# Patient Record
Sex: FEMALE | Race: OTHER | Employment: UNEMPLOYED | URBAN - METROPOLITAN AREA
[De-identification: names, ages, dates, MRNs, and addresses within clinical notes are randomized per-mention and may not be internally consistent; named-entity substitution may affect disease eponyms.]

---

## 2023-08-15 ENCOUNTER — OFFICE VISIT (OUTPATIENT)
Dept: PRIMARY CARE | Facility: CLINIC | Age: 58
End: 2023-08-15
Payer: COMMERCIAL

## 2023-08-15 DIAGNOSIS — E78.00 PURE HYPERCHOLESTEROLEMIA: ICD-10-CM

## 2023-08-15 DIAGNOSIS — Z53.20 COLON CANCER SCREENING DECLINED: ICD-10-CM

## 2023-08-15 DIAGNOSIS — E11.9 TYPE 2 DIABETES MELLITUS WITHOUT COMPLICATION, WITHOUT LONG-TERM CURRENT USE OF INSULIN (MULTI): Primary | ICD-10-CM

## 2023-08-15 DIAGNOSIS — I10 BENIGN ESSENTIAL HYPERTENSION: ICD-10-CM

## 2023-08-15 DIAGNOSIS — E66.09 CLASS 2 OBESITY DUE TO EXCESS CALORIES WITHOUT SERIOUS COMORBIDITY WITH BODY MASS INDEX (BMI) OF 36.0 TO 36.9 IN ADULT: ICD-10-CM

## 2023-08-15 DIAGNOSIS — I48.0 PAROXYSMAL ATRIAL FIBRILLATION (MULTI): ICD-10-CM

## 2023-08-15 DIAGNOSIS — Z00.00 ROUTINE GENERAL MEDICAL EXAMINATION AT A HEALTH CARE FACILITY: ICD-10-CM

## 2023-08-15 DIAGNOSIS — I48.11 LONGSTANDING PERSISTENT ATRIAL FIBRILLATION (MULTI): ICD-10-CM

## 2023-08-15 DIAGNOSIS — Z53.20 MAMMOGRAM DECLINED: ICD-10-CM

## 2023-08-15 LAB
ALANINE AMINOTRANSFERASE (SGPT) (U/L) IN SER/PLAS: 16 U/L (ref 7–45)
ALBUMIN (G/DL) IN SER/PLAS: 4.2 G/DL (ref 3.4–5)
ALKALINE PHOSPHATASE (U/L) IN SER/PLAS: 75 U/L (ref 33–110)
ANION GAP IN SER/PLAS: 15 MMOL/L (ref 10–20)
ASPARTATE AMINOTRANSFERASE (SGOT) (U/L) IN SER/PLAS: 16 U/L (ref 9–39)
BILIRUBIN TOTAL (MG/DL) IN SER/PLAS: 0.6 MG/DL (ref 0–1.2)
CALCIUM (MG/DL) IN SER/PLAS: 9.5 MG/DL (ref 8.6–10.6)
CARBON DIOXIDE, TOTAL (MMOL/L) IN SER/PLAS: 24 MMOL/L (ref 21–32)
CHLORIDE (MMOL/L) IN SER/PLAS: 106 MMOL/L (ref 98–107)
CHOLESTEROL (MG/DL) IN SER/PLAS: 151 MG/DL (ref 0–199)
CHOLESTEROL IN HDL (MG/DL) IN SER/PLAS: 56.5 MG/DL
CHOLESTEROL/HDL RATIO: 2.7
COBALAMIN (VITAMIN B12) (PG/ML) IN SER/PLAS: 808 PG/ML (ref 211–911)
CREATININE (MG/DL) IN SER/PLAS: 0.67 MG/DL (ref 0.5–1.05)
GFR FEMALE: >90 ML/MIN/1.73M2
GLUCOSE (MG/DL) IN SER/PLAS: 104 MG/DL (ref 74–99)
LDL: 70 MG/DL (ref 0–99)
POC HEMOGLOBIN A1C: 5.7 % (ref 4.2–6.5)
POTASSIUM (MMOL/L) IN SER/PLAS: 4 MMOL/L (ref 3.5–5.3)
PROTEIN TOTAL: 7.4 G/DL (ref 6.4–8.2)
SODIUM (MMOL/L) IN SER/PLAS: 141 MMOL/L (ref 136–145)
THYROTROPIN (MIU/L) IN SER/PLAS BY DETECTION LIMIT <= 0.05 MIU/L: 2.57 MIU/L (ref 0.44–3.98)
THYROXINE (T4) FREE (NG/DL) IN SER/PLAS: 1.14 NG/DL (ref 0.78–1.48)
TRIGLYCERIDE (MG/DL) IN SER/PLAS: 123 MG/DL (ref 0–149)
UREA NITROGEN (MG/DL) IN SER/PLAS: 18 MG/DL (ref 6–23)
VLDL: 25 MG/DL (ref 0–40)

## 2023-08-15 PROCEDURE — 84439 ASSAY OF FREE THYROXINE: CPT

## 2023-08-15 PROCEDURE — 82607 VITAMIN B-12: CPT

## 2023-08-15 PROCEDURE — 84443 ASSAY THYROID STIM HORMONE: CPT

## 2023-08-15 PROCEDURE — 85027 COMPLETE CBC AUTOMATED: CPT

## 2023-08-15 PROCEDURE — 80053 COMPREHEN METABOLIC PANEL: CPT

## 2023-08-15 PROCEDURE — 82570 ASSAY OF URINE CREATININE: CPT

## 2023-08-15 PROCEDURE — 3077F SYST BP >= 140 MM HG: CPT | Performed by: FAMILY MEDICINE

## 2023-08-15 PROCEDURE — 1036F TOBACCO NON-USER: CPT | Performed by: FAMILY MEDICINE

## 2023-08-15 PROCEDURE — 3080F DIAST BP >= 90 MM HG: CPT | Performed by: FAMILY MEDICINE

## 2023-08-15 PROCEDURE — 80061 LIPID PANEL: CPT

## 2023-08-15 PROCEDURE — 3008F BODY MASS INDEX DOCD: CPT | Performed by: FAMILY MEDICINE

## 2023-08-15 PROCEDURE — 82043 UR ALBUMIN QUANTITATIVE: CPT

## 2023-08-15 PROCEDURE — 4010F ACE/ARB THERAPY RXD/TAKEN: CPT | Performed by: FAMILY MEDICINE

## 2023-08-15 PROCEDURE — 83036 HEMOGLOBIN GLYCOSYLATED A1C: CPT | Performed by: FAMILY MEDICINE

## 2023-08-15 PROCEDURE — 99386 PREV VISIT NEW AGE 40-64: CPT | Performed by: FAMILY MEDICINE

## 2023-08-15 RX ORDER — RIVAROXABAN 20 MG/1
20 TABLET, FILM COATED ORAL NIGHTLY
COMMUNITY
Start: 2023-07-10 | End: 2024-02-06 | Stop reason: SDUPTHER

## 2023-08-15 RX ORDER — METOPROLOL SUCCINATE 50 MG/1
50 TABLET, EXTENDED RELEASE ORAL DAILY
COMMUNITY
Start: 2023-07-30 | End: 2024-01-30 | Stop reason: SDUPTHER

## 2023-08-15 RX ORDER — LOSARTAN POTASSIUM 50 MG/1
50 TABLET ORAL DAILY
COMMUNITY
Start: 2023-07-30 | End: 2023-08-18 | Stop reason: ALTCHOICE

## 2023-08-15 RX ORDER — ATORVASTATIN CALCIUM 40 MG/1
40 TABLET, FILM COATED ORAL DAILY
COMMUNITY
Start: 2023-07-24 | End: 2024-02-06 | Stop reason: SDUPTHER

## 2023-08-15 RX ORDER — METFORMIN HYDROCHLORIDE 500 MG/1
TABLET ORAL
COMMUNITY
Start: 2023-07-29 | End: 2023-10-20 | Stop reason: SDUPTHER

## 2023-08-15 ASSESSMENT — PATIENT HEALTH QUESTIONNAIRE - PHQ9
SUM OF ALL RESPONSES TO PHQ9 QUESTIONS 1 AND 2: 0
1. LITTLE INTEREST OR PLEASURE IN DOING THINGS: NOT AT ALL
2. FEELING DOWN, DEPRESSED OR HOPELESS: NOT AT ALL

## 2023-08-16 LAB
ALBUMIN (MG/L) IN URINE: 229 MG/L
ALBUMIN/CREATININE (UG/MG) IN URINE: 163.6 UG/MG CRT (ref 0–30)
CREATININE (MG/DL) IN URINE: 140 MG/DL (ref 20–320)
ERYTHROCYTE DISTRIBUTION WIDTH (RATIO) BY AUTOMATED COUNT: 13 % (ref 11.5–14.5)
ERYTHROCYTE MEAN CORPUSCULAR HEMOGLOBIN CONCENTRATION (G/DL) BY AUTOMATED: 30.6 G/DL (ref 32–36)
ERYTHROCYTE MEAN CORPUSCULAR VOLUME (FL) BY AUTOMATED COUNT: 94 FL (ref 80–100)
ERYTHROCYTES (10*6/UL) IN BLOOD BY AUTOMATED COUNT: 4.92 X10E12/L (ref 4–5.2)
HEMATOCRIT (%) IN BLOOD BY AUTOMATED COUNT: 46.1 % (ref 36–46)
HEMOGLOBIN (G/DL) IN BLOOD: 14.1 G/DL (ref 12–16)
LEUKOCYTES (10*3/UL) IN BLOOD BY AUTOMATED COUNT: 10 X10E9/L (ref 4.4–11.3)
NRBC (PER 100 WBCS) BY AUTOMATED COUNT: 0 /100 WBC (ref 0–0)
PLATELETS (10*3/UL) IN BLOOD AUTOMATED COUNT: 356 X10E9/L (ref 150–450)

## 2023-08-18 DIAGNOSIS — I10 BENIGN ESSENTIAL HYPERTENSION: Primary | ICD-10-CM

## 2023-08-18 RX ORDER — LOSARTAN POTASSIUM 100 MG/1
100 TABLET ORAL DAILY
Qty: 90 TABLET | Refills: 1 | Status: SHIPPED | OUTPATIENT
Start: 2023-08-18 | End: 2024-05-17 | Stop reason: ALTCHOICE

## 2023-08-20 VITALS
WEIGHT: 215 LBS | SYSTOLIC BLOOD PRESSURE: 150 MMHG | BODY MASS INDEX: 35.82 KG/M2 | HEIGHT: 65 IN | HEART RATE: 80 BPM | DIASTOLIC BLOOD PRESSURE: 90 MMHG

## 2023-08-20 PROBLEM — Z00.00 ROUTINE GENERAL MEDICAL EXAMINATION AT A HEALTH CARE FACILITY: Status: ACTIVE | Noted: 2023-08-20

## 2023-08-20 PROBLEM — I10 BENIGN ESSENTIAL HYPERTENSION: Status: ACTIVE | Noted: 2023-08-20

## 2023-08-20 PROBLEM — E66.09 CLASS 2 OBESITY DUE TO EXCESS CALORIES WITHOUT SERIOUS COMORBIDITY WITH BODY MASS INDEX (BMI) OF 36.0 TO 36.9 IN ADULT: Status: ACTIVE | Noted: 2023-08-20

## 2023-08-20 PROBLEM — E66.812 CLASS 2 OBESITY DUE TO EXCESS CALORIES WITHOUT SERIOUS COMORBIDITY WITH BODY MASS INDEX (BMI) OF 36.0 TO 36.9 IN ADULT: Status: ACTIVE | Noted: 2023-08-20

## 2023-08-20 PROBLEM — Z53.20 MAMMOGRAM DECLINED: Status: ACTIVE | Noted: 2023-08-20

## 2023-08-20 PROBLEM — I48.11 LONGSTANDING PERSISTENT ATRIAL FIBRILLATION (MULTI): Status: ACTIVE | Noted: 2023-08-20

## 2023-08-20 PROBLEM — E78.00 PURE HYPERCHOLESTEROLEMIA: Status: ACTIVE | Noted: 2023-08-20

## 2023-08-20 PROBLEM — I48.0 PAROXYSMAL ATRIAL FIBRILLATION (MULTI): Status: ACTIVE | Noted: 2023-08-20

## 2023-08-20 PROBLEM — E11.9 TYPE 2 DIABETES MELLITUS WITHOUT COMPLICATION, WITHOUT LONG-TERM CURRENT USE OF INSULIN (MULTI): Status: ACTIVE | Noted: 2023-08-20

## 2023-08-20 PROBLEM — Z53.20 COLON CANCER SCREENING DECLINED: Status: ACTIVE | Noted: 2023-08-20

## 2023-08-20 NOTE — PROGRESS NOTES
"  Subjective     Patient ID: Britany Cruz is a 58 y.o. female who presents for New Patient Visit.      Last Physical :  1 Years ago     Pt's PMH, PSH, SH, FH , meds and allergies was obtained / reviewed and updated .     Dental visits : Y  Vision issues : N  Hearing issues : N    Immunizations : Y    Diet :  could be better  Exercise:  Weight concerns :     Alcohol: as noted in SH  Tobacco: as noted in SH  Recreational drug use : None/ as noted in SH    Sexually active : Active   Contraception :   Menstrual problems:  Premenopausal/perimenopausal/ postmenopausal:    G:  Parity:  Full term:    Premature:   (s):   Living :  Ab induced:   Ab spontaneous :  Ectopic :   Multiple :    PAP smear : decline  Mammogram : declined  Colonoscopy: declined    Metabolic screening   - Lipid   - Glucose    [facility]. Diagnosed with Afib DM HTN . Started meds Was seeing cardiology  Had ECHO . Was going to be scheduled for another test prior to moving.   Feels ok now Lost weight  Taking meds A1c 5.9 today/   NO s/e from meds  Denies sleep apnea   NO family h/o arrythmias  Alexander AC/ No bleeding  Denies CP SOB   ======================================================    Visit Vitals  Blood Pressure 150/90   Pulse 80   Height 1.638 m (5' 4.5\")   Weight 97.5 kg (215 lb)   Body Mass Index 36.33 kg/m²   Smoking Status Never   Body Surface Area 2.11 m²      No LMP recorded.     =====================================    Review of systems:  Constitutional: no chills, no fever and no night sweats.     Eyes: no blurred vision and no eyesight problems.     ENT: no hearing loss, no nasal congestion, no nasal discharge, no hoarseness and no sore throat.     Cardiovascular: no chest pain, no intermittent leg claudication, no lower extremity edema, no palpitations and no syncope.     Respiratory: no cough, no shortness of breath during exertion, no shortness of breath at " rest and no wheezing.     Gastrointestinal: no abdominal pain, no blood in stools, no constipation, no diarrhea, no melena, no nausea, no rectal pain and no vomiting.     Genitourinary: no dysuria, no change in urinary frequency, no urinary hesitancy, no feelings of urinary urgency and no vaginal discharge.     Musculoskeletal: no arthralgias, no back pain and no myalgias.     Integumentary: no new skin lesions and no rashes.     Neurological: no difficulty walking, no headache, no limb weakness, no numbness and no tingling.     Psychiatric: no anxiety, no depression, no anhedonia and no substance use disorders.   ============================================================    Physical exam :    Constitutional: Alert and in no acute distress. Well developed, well nourished.     Eyes: Normal external exam. Pupils were equal in size, round, reactive to light (PERRL) with normal accommodation and extraocular movements intact (EOMI).     Ears, Nose, Mouth, and Throat: External inspection of ears and nose: Normal.  Otoscopic examination: Normal.      Neck: No neck mass was observed. Supple.     Cardiovascular: Heart rate and rhythm were normal, normal S1 and S2, no gallops, no murmurs and no pericardial rub    Pulmonary: No respiratory distress. Clear bilateral breath sounds.     Abdomen: Soft nontender; no abdominal mass palpated. No organomegaly.     Musculoskeletal: No joint swelling seen, normal movements of all extremities. Range of motion: Normal.  Muscle strength/tone: Normal.      Skin: Normal skin color and pigmentation, normal skin turgor, and no rash.     Neurologic: Deep tendon reflexes were 2+ and symmetric. Sensation: Normal.     Psychiatric: Judgment and insight: Intact. Mood and affect: Normal.    Lymphatic : Cervical/ axillary/ groin Lns Palpable/ non palpable     Endocrine: no recent weight gain and no recent weight loss.     Hematologic/Lymphatic: no tendency for easy bruising and no swollen glands.           All other systems have been reviewed and are negative for complaint.      Assessment/Plan    Problem List Items Addressed This Visit       Routine general medical examination at a health care facility    Relevant Orders    Comprehensive Metabolic Panel (Completed)    CBC (Completed)    Lipid Panel (Completed)    Paroxysmal atrial fibrillation (CMS/[facility])     Refer to cardiology  Monitor for sleep apnea         Relevant Medications    metoprolol succinate XL (Toprol-XL) 50 mg 24 hr tablet    Type 2 diabetes mellitus without complication, without long-term current use of insulin (CMS/[facility]) - Primary     Education provided re: diabetes pathophysiology and management, including effectiveness of diet, exercise, and medication in controlling sugar and preventing complications, as well as information on the complications and recommended care. I want your fasting morning blood sugar to be less than 100.   Goal is for your sugar to be less than 140 two hours after a meal.           Relevant Orders    Albumin , Urine Random (Completed)    POCT glycosylated hemoglobin (Hb A1C) manually resulted (Completed)    Vitamin B12 (Completed)    Benign essential hypertension     BP elevated incrased losartan to 100mg   I recommend that you exercise for 30-45 minutes 5 days a week.  I also recommend a balanced diet with fruits and vegetables every day, lean meats, and little fried foods. The DASH diet (you can find this online) is a good example of this.           Pure hypercholesterolemia     Check lipid panel continue medications continue healthy eating work out  150 minutes a week           Class 2 obesity due to excess calories without serious comorbidity with body mass index (BMI) of 36.0 to 36.9 in adult     Watch what you eat and include more vegetables on your plate.  Portion control and exercise will help in loosing weight .   It is recommended to get 150 mins of brisk exercise in a week . 150 mins of exercise per week  will help in maintaining your current weight, if you are already working out . Exercise should make your heart rate go up.   Calorie deficit ( spending more calories than eating ) will result in weight loss    A deficit of 500 calories per day in 7 days would results in 1lbs weight loss per week., Aim for 1-2 lbs weight loss per month.   You can reduce the calorie intake by 250 calories and spend 250 calories by working out which would give you a total deficit of 500 calories     No sugary/ sweetened beverages , portion control , dedicated physical activity atleast 5 times a week advised .            Mammogram declined    Colon cancer screening declined   Follow up 3 months

## 2023-08-21 NOTE — ASSESSMENT & PLAN NOTE
Education provided re: diabetes pathophysiology and management, including effectiveness of diet, exercise, and medication in controlling sugar and preventing complications, as well as information on the complications and recommended care. I want your fasting morning blood sugar to be less than 100.   Goal is for your sugar to be less than 140 two hours after a meal.

## 2023-08-21 NOTE — ASSESSMENT & PLAN NOTE
BP elevated incrased losartan to 100mg   I recommend that you exercise for 30-45 minutes 5 days a week.  I also recommend a balanced diet with fruits and vegetables every day, lean meats, and little fried foods. The DASH diet (you can find this online) is a good example of this.

## 2023-10-06 ENCOUNTER — HOSPITAL ENCOUNTER (OUTPATIENT)
Dept: CARDIOLOGY | Facility: CLINIC | Age: 58
Discharge: HOME | End: 2023-10-06
Payer: COMMERCIAL

## 2023-10-06 ENCOUNTER — OFFICE VISIT (OUTPATIENT)
Dept: CARDIOLOGY | Facility: CLINIC | Age: 58
End: 2023-10-06
Payer: COMMERCIAL

## 2023-10-06 VITALS
HEIGHT: 64 IN | SYSTOLIC BLOOD PRESSURE: 156 MMHG | BODY MASS INDEX: 37.56 KG/M2 | OXYGEN SATURATION: 93 % | DIASTOLIC BLOOD PRESSURE: 86 MMHG | WEIGHT: 220 LBS | HEART RATE: 79 BPM

## 2023-10-06 DIAGNOSIS — E11.9 TYPE 2 DIABETES MELLITUS WITHOUT COMPLICATION, WITHOUT LONG-TERM CURRENT USE OF INSULIN (MULTI): ICD-10-CM

## 2023-10-06 DIAGNOSIS — I44.7 LEFT BUNDLE BRANCH BLOCK (LBBB): ICD-10-CM

## 2023-10-06 DIAGNOSIS — I48.0 PAROXYSMAL ATRIAL FIBRILLATION (MULTI): ICD-10-CM

## 2023-10-06 DIAGNOSIS — I10 BENIGN ESSENTIAL HYPERTENSION: Primary | ICD-10-CM

## 2023-10-06 PROCEDURE — 99214 OFFICE O/P EST MOD 30 MIN: CPT | Mod: 25 | Performed by: INTERNAL MEDICINE

## 2023-10-06 PROCEDURE — 1036F TOBACCO NON-USER: CPT | Performed by: INTERNAL MEDICINE

## 2023-10-06 PROCEDURE — 3008F BODY MASS INDEX DOCD: CPT | Performed by: INTERNAL MEDICINE

## 2023-10-06 PROCEDURE — 3077F SYST BP >= 140 MM HG: CPT | Performed by: INTERNAL MEDICINE

## 2023-10-06 PROCEDURE — 3079F DIAST BP 80-89 MM HG: CPT | Performed by: INTERNAL MEDICINE

## 2023-10-06 PROCEDURE — 4010F ACE/ARB THERAPY RXD/TAKEN: CPT | Performed by: INTERNAL MEDICINE

## 2023-10-06 PROCEDURE — 99204 OFFICE O/P NEW MOD 45 MIN: CPT | Performed by: INTERNAL MEDICINE

## 2023-10-06 PROCEDURE — 93010 ELECTROCARDIOGRAM REPORT: CPT | Performed by: INTERNAL MEDICINE

## 2023-10-06 PROCEDURE — 93005 ELECTROCARDIOGRAM TRACING: CPT

## 2023-10-06 ASSESSMENT — ENCOUNTER SYMPTOMS
LOSS OF SENSATION IN FEET: 0
OCCASIONAL FEELINGS OF UNSTEADINESS: 0
DEPRESSION: 0

## 2023-10-06 ASSESSMENT — PAIN SCALES - GENERAL: PAINLEVEL: 0-NO PAIN

## 2023-10-06 NOTE — PROGRESS NOTES
Subjective   Britany Cruz is a 58 y.o. female.    Chief Complaint:  New Patient Visit (Establishing care)  Referred by Dr. Gwyn Lobato came to establish cardiac care  HPI  She was in her usual state of health until December 2022 which she had [facility] [place] around [place].  She was ill with pneumonia and have A-fib.  She was eventually discharged I presume the A-fib was paroxysmal.  She saw her cardiologist a month later.  She has not been symptomatic.  She has had no recurrence.  She has had no antiarrhythmic drug treatment, no cardioversions.  No symptoms of angina, heart failure, orthopnea, edema, calf claudication.  Does have some family history of CAD.  Brother had an MI in his 30s.  No alcohol tobacco use.  Limited caffeine use.  Generally in good health with no other major medical illnesses.  10 point systems review was unrevealing for significant complaint other than what been noted in the HPI  ROS    Objective   Physical Exam well-developed well-nourished female no acute distress.  Flat JVP.  Normal carotid upstrokes no bruits.  Regular rhythm without gallop or murmur.  Soft abdomen without masses.  No dependent edema with intact pedal pulses    Lab Review:   Office Visit on 08/15/2023   Component Date Value    ALBUMIN (MG/L) IN URINE 08/15/2023 229.0     Albumin/Creatine Ratio 08/15/2023 163.6 (H)     Creatinine, Urine 08/15/2023 140.0     Glucose 08/15/2023 104 (H)     Sodium 08/15/2023 141     Potassium 08/15/2023 4.0     Chloride 08/15/2023 106     Bicarbonate 08/15/2023 24     Anion Gap 08/15/2023 15     Urea Nitrogen 08/15/2023 18     Creatinine 08/15/2023 0.67     GFR Female 08/15/2023 >90     Calcium 08/15/2023 9.5     Albumin 08/15/2023 4.2     Alkaline Phosphatase 08/15/2023 75     Total Protein 08/15/2023 7.4     AST 08/15/2023 16     Total Bilirubin 08/15/2023 0.6     ALT (SGPT) 08/15/2023 16     WBC 08/15/2023 10.0     nRBC 08/15/2023 0.0     RBC 08/15/2023 4.92      Hemoglobin 08/15/2023 14.1     Hematocrit 08/15/2023 46.1 (H)     MCV 08/15/2023 94     MCHC 08/15/2023 30.6 (L)     Platelets 08/15/2023 356     RDW 08/15/2023 13.0     Free T4 08/15/2023 1.14     TSH 08/15/2023 2.57     Cholesterol 08/15/2023 151     HDL 08/15/2023 56.5     Cholesterol/HDL Ratio 08/15/2023 2.7     LDL 08/15/2023 70     VLDL 08/15/2023 25     Triglycerides 08/15/2023 123     POC HEMOGLOBIN A1c 08/15/2023 5.7     Vitamin B-12 08/15/2023 808    ECG today sinus rhythm left axis deviation, left bundle branch block heart rate of 79 bpm QRS duration of 148 ms    Assessment/Plan   The primary encounter diagnosis was Benign essential hypertension. Diagnoses of Paroxysmal atrial fibrillation (CMS/HCC), Left bundle branch block (LBBB), and Type 2 diabetes mellitus without complication, without long-term current use of insulin (CMS/HCC) were also pertinent to this visit.  Generally well 58-year-old female was hospitalized with pneumonia December 2022 at a hospital in [place] with what sounds like pneumonia was diagnosed with paroxysmal atrial fibrillation.  She has had no clinical recurrence.  Would like to document A-fib burden as well as assess systolic function given left bundle branch block and her A-fib.  She does not have documented SIGRID, she does not drink excessively so I do not see there is much in the way of other causes for her A-fib.  Data I do not think she has had any thyroid disorder.  I will call her after her 2-week monitor and echo will be completed with further recommendations.  For now I will continue her on anticoagulation.  Although based on her [id number] 2 score is only 1 and is arguable whether she justifies long-term anticoagulation use.  But she did develop diabetes when she was acutely ill she is on metformin so that would give her another point so I would keep her on the anticoagulation.  Thank you for your referral

## 2023-10-16 ENCOUNTER — APPOINTMENT (OUTPATIENT)
Dept: CARDIOLOGY | Facility: CLINIC | Age: 58
End: 2023-10-16
Payer: COMMERCIAL

## 2023-10-20 DIAGNOSIS — E11.9 TYPE 2 DIABETES MELLITUS WITHOUT COMPLICATION, WITHOUT LONG-TERM CURRENT USE OF INSULIN (MULTI): ICD-10-CM

## 2023-10-20 RX ORDER — METFORMIN HYDROCHLORIDE 500 MG/1
500 TABLET ORAL
Qty: 180 TABLET | Refills: 1 | Status: SHIPPED | OUTPATIENT
Start: 2023-10-20 | End: 2024-02-06 | Stop reason: SDUPTHER

## 2023-10-27 ENCOUNTER — HOSPITAL ENCOUNTER (OUTPATIENT)
Dept: CARDIOLOGY | Facility: CLINIC | Age: 58
Discharge: HOME | End: 2023-10-27
Payer: COMMERCIAL

## 2023-10-27 DIAGNOSIS — I48.0 PAROXYSMAL ATRIAL FIBRILLATION (MULTI): ICD-10-CM

## 2023-10-27 DIAGNOSIS — I44.7 LEFT BUNDLE BRANCH BLOCK (LBBB): ICD-10-CM

## 2023-10-27 DIAGNOSIS — I48.0 PAROXYSMAL ATRIAL FIBRILLATION (MULTI): Primary | ICD-10-CM

## 2023-10-27 DIAGNOSIS — E11.9 TYPE 2 DIABETES MELLITUS WITHOUT COMPLICATION, WITHOUT LONG-TERM CURRENT USE OF INSULIN (MULTI): ICD-10-CM

## 2023-10-27 PROCEDURE — 93306 TTE W/DOPPLER COMPLETE: CPT | Performed by: INTERNAL MEDICINE

## 2023-10-27 PROCEDURE — 93306 TTE W/DOPPLER COMPLETE: CPT

## 2023-10-27 PROCEDURE — 93246 EXT ECG>7D<15D RECORDING: CPT

## 2023-10-27 PROCEDURE — 93246 EXT ECG>7D<15D RECORDING: CPT | Performed by: INTERNAL MEDICINE

## 2023-10-27 PROCEDURE — 2500000004 HC RX 250 GENERAL PHARMACY W/ HCPCS (ALT 636 FOR OP/ED): Performed by: INTERNAL MEDICINE

## 2023-10-27 RX ADMIN — PERFLUTREN 2 ML OF DILUTION: 6.52 INJECTION, SUSPENSION INTRAVENOUS at 16:41

## 2023-10-28 LAB — EJECTION FRACTION APICAL 4 CHAMBER: 54.3

## 2023-10-30 ENCOUNTER — TELEPHONE (OUTPATIENT)
Dept: CARDIOLOGY | Facility: CLINIC | Age: 58
End: 2023-10-30
Payer: COMMERCIAL

## 2023-10-30 NOTE — TELEPHONE ENCOUNTER
----- Message from Ulisses Wright MD sent at 10/30/2023 12:02 PM EDT -----  Please let her know that the echo showed essentially normal heart size and function no significant valvular problems or residual effect from the atrial fibrillation this is good news

## 2023-10-30 NOTE — TELEPHONE ENCOUNTER
Spoke to patient she was very happy with the results of her test.  She understood and will keep on the med regime she currently follows

## 2023-10-30 NOTE — RESULT ENCOUNTER NOTE
Please let her know that the echo showed essentially normal heart size and function no significant valvular problems or residual effect from the atrial fibrillation this is good news

## 2023-11-14 LAB
ATRIAL RATE: 79 BPM
P OFFSET: 174 MS
P ONSET: 121 MS
PR INTERVAL: 176 MS
Q ONSET: 209 MS
QRS COUNT: 13 BEATS
QRS DURATION: 148 MS
QT INTERVAL: 430 MS
QTC CALCULATION(BAZETT): 493 MS
QTC FREDERICIA: 471 MS
R AXIS: -32 DEGREES
T AXIS: 140 DEGREES
T OFFSET: 424 MS
VENTRICULAR RATE: 79 BPM

## 2023-11-21 ENCOUNTER — APPOINTMENT (OUTPATIENT)
Dept: PRIMARY CARE | Facility: CLINIC | Age: 58
End: 2023-11-21
Payer: COMMERCIAL

## 2023-11-28 ENCOUNTER — APPOINTMENT (OUTPATIENT)
Dept: PRIMARY CARE | Facility: CLINIC | Age: 58
End: 2023-11-28
Payer: COMMERCIAL

## 2023-11-28 ENCOUNTER — TELEPHONE (OUTPATIENT)
Dept: CARDIOLOGY | Facility: CLINIC | Age: 58
End: 2023-11-28

## 2023-11-28 NOTE — TELEPHONE ENCOUNTER
----- Message from Ulisses Wright MD sent at 11/27/2023  8:17 AM EST -----  Please inform her echo was essentially normal, monitor showed no atrial fibs would still continue on medication with anticoagulation

## 2024-01-09 ENCOUNTER — APPOINTMENT (OUTPATIENT)
Dept: PRIMARY CARE | Facility: CLINIC | Age: 59
End: 2024-01-09
Payer: COMMERCIAL

## 2024-01-30 DIAGNOSIS — I10 BENIGN ESSENTIAL HYPERTENSION: ICD-10-CM

## 2024-01-30 DIAGNOSIS — E11.9 TYPE 2 DIABETES MELLITUS WITHOUT COMPLICATION, WITHOUT LONG-TERM CURRENT USE OF INSULIN (MULTI): ICD-10-CM

## 2024-01-30 RX ORDER — METOPROLOL SUCCINATE 50 MG/1
50 TABLET, EXTENDED RELEASE ORAL DAILY
Qty: 30 TABLET | Refills: 0 | Status: SHIPPED | OUTPATIENT
Start: 2024-01-30 | End: 2024-02-06 | Stop reason: SDUPTHER

## 2024-01-31 DIAGNOSIS — I10 BENIGN ESSENTIAL HYPERTENSION: ICD-10-CM

## 2024-02-01 RX ORDER — METOPROLOL SUCCINATE 50 MG/1
50 TABLET, EXTENDED RELEASE ORAL DAILY
Qty: 30 TABLET | Refills: 0 | OUTPATIENT
Start: 2024-02-01

## 2024-02-06 ENCOUNTER — OFFICE VISIT (OUTPATIENT)
Dept: PRIMARY CARE | Facility: CLINIC | Age: 59
End: 2024-02-06
Payer: COMMERCIAL

## 2024-02-06 DIAGNOSIS — E78.00 PURE HYPERCHOLESTEROLEMIA: ICD-10-CM

## 2024-02-06 DIAGNOSIS — I10 BENIGN ESSENTIAL HYPERTENSION: ICD-10-CM

## 2024-02-06 DIAGNOSIS — E11.9 TYPE 2 DIABETES MELLITUS WITHOUT COMPLICATION, WITHOUT LONG-TERM CURRENT USE OF INSULIN (MULTI): ICD-10-CM

## 2024-02-06 DIAGNOSIS — E66.09 CLASS 2 OBESITY DUE TO EXCESS CALORIES WITHOUT SERIOUS COMORBIDITY WITH BODY MASS INDEX (BMI) OF 36.0 TO 36.9 IN ADULT: Primary | ICD-10-CM

## 2024-02-06 DIAGNOSIS — I48.0 PAROXYSMAL ATRIAL FIBRILLATION (MULTI): ICD-10-CM

## 2024-02-06 LAB — POC HEMOGLOBIN A1C: 5.9 % (ref 4.2–6.5)

## 2024-02-06 PROCEDURE — 1036F TOBACCO NON-USER: CPT | Performed by: FAMILY MEDICINE

## 2024-02-06 PROCEDURE — 99214 OFFICE O/P EST MOD 30 MIN: CPT | Performed by: FAMILY MEDICINE

## 2024-02-06 PROCEDURE — 3008F BODY MASS INDEX DOCD: CPT | Performed by: FAMILY MEDICINE

## 2024-02-06 PROCEDURE — 83036 HEMOGLOBIN GLYCOSYLATED A1C: CPT | Performed by: FAMILY MEDICINE

## 2024-02-06 PROCEDURE — 3077F SYST BP >= 140 MM HG: CPT | Performed by: FAMILY MEDICINE

## 2024-02-06 PROCEDURE — 3079F DIAST BP 80-89 MM HG: CPT | Performed by: FAMILY MEDICINE

## 2024-02-06 PROCEDURE — 4010F ACE/ARB THERAPY RXD/TAKEN: CPT | Performed by: FAMILY MEDICINE

## 2024-02-06 RX ORDER — METFORMIN HYDROCHLORIDE 500 MG/1
500 TABLET ORAL
Qty: 180 TABLET | Refills: 0 | Status: SHIPPED | OUTPATIENT
Start: 2024-02-06 | End: 2024-05-17 | Stop reason: SDUPTHER

## 2024-02-06 RX ORDER — ATORVASTATIN CALCIUM 40 MG/1
40 TABLET, FILM COATED ORAL DAILY
Qty: 90 TABLET | Refills: 0 | Status: SHIPPED | OUTPATIENT
Start: 2024-02-06 | End: 2024-05-17 | Stop reason: SDUPTHER

## 2024-02-06 RX ORDER — LOSARTAN POTASSIUM AND HYDROCHLOROTHIAZIDE 25; 100 MG/1; MG/1
1 TABLET ORAL DAILY
Qty: 30 TABLET | Refills: 11 | Status: SHIPPED | OUTPATIENT
Start: 2024-02-06 | End: 2024-05-17 | Stop reason: SDUPTHER

## 2024-02-06 RX ORDER — LOSARTAN POTASSIUM 100 MG/1
100 TABLET ORAL DAILY
Qty: 90 TABLET | Refills: 1 | Status: CANCELLED | OUTPATIENT
Start: 2024-02-06 | End: 2024-08-04

## 2024-02-06 RX ORDER — METOPROLOL SUCCINATE 50 MG/1
50 TABLET, EXTENDED RELEASE ORAL DAILY
Qty: 90 TABLET | Refills: 0 | Status: SHIPPED | OUTPATIENT
Start: 2024-02-06 | End: 2024-05-17 | Stop reason: SDUPTHER

## 2024-02-06 ASSESSMENT — PATIENT HEALTH QUESTIONNAIRE - PHQ9
1. LITTLE INTEREST OR PLEASURE IN DOING THINGS: NOT AT ALL
2. FEELING DOWN, DEPRESSED OR HOPELESS: NOT AT ALL
SUM OF ALL RESPONSES TO PHQ9 QUESTIONS 1 AND 2: 0

## 2024-02-06 ASSESSMENT — ENCOUNTER SYMPTOMS
OCCASIONAL FEELINGS OF UNSTEADINESS: 0
DEPRESSION: 0
LOSS OF SENSATION IN FEET: 0

## 2024-02-06 NOTE — PROGRESS NOTES
Chief Complaint/HPI:  Patient returns today for follo wup for DM HTN Afib. Denies CP SOB  Saw cardiology  ECHO and holter monitor done.   Taking meds as prescribed  Refuses yareli colonoscopy       ROS otherwise negative aside from what was mentioned above in HPI.      Patient Active Problem List   Diagnosis    Routine general medical examination at a health care facility    Paroxysmal atrial fibrillation (CMS/HCC)    Type 2 diabetes mellitus without complication, without long-term current use of insulin (CMS/HCC)    Benign essential hypertension    Pure hypercholesterolemia    Class 2 obesity due to excess calories without serious comorbidity with body mass index (BMI) of 36.0 to 36.9 in adult    Mammogram declined    Colon cancer screening declined    Left bundle branch block (LBBB)     No past medical history on file.  Past Surgical History:   Procedure Laterality Date     SECTION, LOW TRANSVERSE       Family History   Problem Relation Name Age of Onset    Cancer Mother      Lung cancer Other       Social History     Tobacco Use    Smoking status: Never    Smokeless tobacco: Never   Substance Use Topics    Alcohol use: Never    Drug use: Never         ALLERGIES  No Known Allergies      MEDICATIONS  Current Outpatient Medications   Medication Sig Dispense Refill    losartan (Cozaar) 100 mg tablet Take 1 tablet (100 mg) by mouth once daily. 90 tablet 1    atorvastatin (Lipitor) 40 mg tablet Take 1 tablet (40 mg) by mouth once daily. 90 tablet 0    losartan-hydrochlorothiazide (Hyzaar) 100-25 mg tablet Take 1 tablet by mouth once daily. 30 tablet 11    metFORMIN (Glucophage) 500 mg tablet Take 1 tablet (500 mg) by mouth 2 times a day with meals. 180 tablet 0    metoprolol succinate XL (Toprol-XL) 50 mg 24 hr tablet Take 1 tablet (50 mg) by mouth once daily. 90 tablet 0    rivaroxaban (Xarelto) 20 mg tablet Take 1 tablet (20 mg) by mouth once daily at bedtime. 90 tablet 0     No current facility-administered  medications for this visit.         PHYSICAL EXAM  Visit Vitals  Blood Pressure 146/86   Pulse 76       Body mass index is 39.71 kg/m².  Gen: Alert, NAD  HEENT:  PERRLA, EOMI, conjunctiva and sclera normal in appearance  Respiratory:  Lungs CTAB  Cardiovascular:  Heart RRR. No M/R/G  Neuro:  Gross motor and sensory intact  Skin:  No suspicious lesions present    ASSESSMENT/PLAN  Problem List Items Addressed This Visit       Paroxysmal atrial fibrillation (CMS/HCC)    Current Assessment & Plan     Follow up with cardiology . C/w AC.          Relevant Medications    rivaroxaban (Xarelto) 20 mg tablet    metoprolol succinate XL (Toprol-XL) 50 mg 24 hr tablet    Type 2 diabetes mellitus without complication, without long-term current use of insulin (CMS/HCC)    Relevant Medications    metFORMIN (Glucophage) 500 mg tablet    Other Relevant Orders    POCT glycosylated hemoglobin (Hb A1C) manually resulted (Completed)    Benign essential hypertension    Current Assessment & Plan     BP elevated incrased losartan to 100mg   I recommend that you exercise for 30-45 minutes 5 days a week.  I also recommend a balanced diet with fruits and vegetables every day, lean meats, and little fried foods. The DASH diet (you can find this online) is a good example of this.           Relevant Medications    metoprolol succinate XL (Toprol-XL) 50 mg 24 hr tablet    losartan-hydrochlorothiazide (Hyzaar) 100-25 mg tablet    Pure hypercholesterolemia    Current Assessment & Plan     Continue medications continue healthy eating work out  150 minutes a week .          Relevant Medications    atorvastatin (Lipitor) 40 mg tablet    Class 2 obesity due to excess calories without serious comorbidity with body mass index (BMI) of 36.0 to 36.9 in adult - Primary    Current Assessment & Plan     Watch what you eat and include more vegetables on your plate.  Portion control and exercise will help in loosing weight .   It is recommended to get 150 mins  of brisk exercise in a week . 150 mins of exercise per week will help in maintaining your current weight, if you are already working out . Exercise should make your heart rate go up.   Calorie deficit ( spending more calories than eating ) will result in weight loss    A deficit of 500 calories per day in 7 days would results in 1lbs weight loss per week., Aim for 1-2 lbs weight loss per month.   You can reduce the calorie intake by 250 calories and spend 250 calories by working out which would give you a total deficit of 500 calories     No sugary/ sweetened beverages , portion control , dedicated physical activity atleast 5 times a week advised .                   Gwyn Lobato MD

## 2024-02-10 VITALS
SYSTOLIC BLOOD PRESSURE: 146 MMHG | DIASTOLIC BLOOD PRESSURE: 86 MMHG | WEIGHT: 235 LBS | HEART RATE: 76 BPM | BODY MASS INDEX: 39.15 KG/M2 | HEIGHT: 65 IN

## 2024-04-11 ENCOUNTER — APPOINTMENT (OUTPATIENT)
Dept: PRIMARY CARE | Facility: CLINIC | Age: 59
End: 2024-04-11
Payer: COMMERCIAL

## 2024-05-03 DIAGNOSIS — E11.9 TYPE 2 DIABETES MELLITUS WITHOUT COMPLICATION, WITHOUT LONG-TERM CURRENT USE OF INSULIN (MULTI): ICD-10-CM

## 2024-05-03 RX ORDER — METFORMIN HYDROCHLORIDE 500 MG/1
500 TABLET ORAL
Qty: 180 TABLET | Refills: 0 | OUTPATIENT
Start: 2024-05-03

## 2024-05-17 ENCOUNTER — OFFICE VISIT (OUTPATIENT)
Dept: PRIMARY CARE | Facility: CLINIC | Age: 59
End: 2024-05-17
Payer: COMMERCIAL

## 2024-05-17 DIAGNOSIS — S16.1XXD STRAIN OF NECK MUSCLE, SUBSEQUENT ENCOUNTER: ICD-10-CM

## 2024-05-17 DIAGNOSIS — I10 BENIGN ESSENTIAL HYPERTENSION: ICD-10-CM

## 2024-05-17 DIAGNOSIS — I48.0 PAROXYSMAL ATRIAL FIBRILLATION (MULTI): ICD-10-CM

## 2024-05-17 DIAGNOSIS — E78.00 PURE HYPERCHOLESTEROLEMIA: ICD-10-CM

## 2024-05-17 DIAGNOSIS — E11.9 TYPE 2 DIABETES MELLITUS WITHOUT COMPLICATION, WITHOUT LONG-TERM CURRENT USE OF INSULIN (MULTI): Primary | ICD-10-CM

## 2024-05-17 LAB — POC HEMOGLOBIN A1C: 5.7 % (ref 4.2–6.5)

## 2024-05-17 PROCEDURE — 3075F SYST BP GE 130 - 139MM HG: CPT | Performed by: FAMILY MEDICINE

## 2024-05-17 PROCEDURE — 3008F BODY MASS INDEX DOCD: CPT | Performed by: FAMILY MEDICINE

## 2024-05-17 PROCEDURE — 3079F DIAST BP 80-89 MM HG: CPT | Performed by: FAMILY MEDICINE

## 2024-05-17 PROCEDURE — 83036 HEMOGLOBIN GLYCOSYLATED A1C: CPT | Performed by: FAMILY MEDICINE

## 2024-05-17 PROCEDURE — 99214 OFFICE O/P EST MOD 30 MIN: CPT | Performed by: FAMILY MEDICINE

## 2024-05-17 RX ORDER — METOPROLOL SUCCINATE 50 MG/1
50 TABLET, EXTENDED RELEASE ORAL DAILY
Qty: 90 TABLET | Refills: 1 | Status: SHIPPED | OUTPATIENT
Start: 2024-05-17

## 2024-05-17 RX ORDER — ATORVASTATIN CALCIUM 40 MG/1
40 TABLET, FILM COATED ORAL DAILY
Qty: 90 TABLET | Refills: 1 | Status: SHIPPED | OUTPATIENT
Start: 2024-05-17

## 2024-05-17 RX ORDER — CYCLOBENZAPRINE HCL 5 MG
5 TABLET ORAL NIGHTLY PRN
Qty: 30 TABLET | Refills: 0 | Status: SHIPPED | OUTPATIENT
Start: 2024-05-17 | End: 2024-06-16

## 2024-05-17 RX ORDER — METFORMIN HYDROCHLORIDE 500 MG/1
500 TABLET ORAL
Qty: 180 TABLET | Refills: 0 | Status: SHIPPED | OUTPATIENT
Start: 2024-05-17 | End: 2024-11-13

## 2024-05-17 RX ORDER — LOSARTAN POTASSIUM AND HYDROCHLOROTHIAZIDE 25; 100 MG/1; MG/1
1 TABLET ORAL DAILY
Qty: 90 TABLET | Refills: 1 | Status: SHIPPED | OUTPATIENT
Start: 2024-05-17 | End: 2025-05-17

## 2024-05-17 RX ORDER — PREDNISONE 20 MG/1
20 TABLET ORAL 2 TIMES DAILY
Qty: 14 TABLET | Refills: 0 | Status: SHIPPED | OUTPATIENT
Start: 2024-05-17 | End: 2024-05-24

## 2024-05-17 NOTE — PROGRESS NOTES
"Subjective   Patient ID: Britany Cruz is a 58 y.o. female who presents for Shoulder Pain.      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     LMP 3 years  unknown PAP\  Normal in the past   Current Outpatient Medications on File Prior to Visit   Medication Sig Dispense Refill    atorvastatin (Lipitor) 40 mg tablet Take 1 tablet (40 mg) by mouth once daily. 90 tablet 0    losartan-hydrochlorothiazide (Hyzaar) 100-25 mg tablet Take 1 tablet by mouth once daily. 30 tablet 11    metFORMIN (Glucophage) 500 mg tablet Take 1 tablet (500 mg) by mouth 2 times a day with meals. 180 tablet 0    metoprolol succinate XL (Toprol-XL) 50 mg 24 hr tablet Take 1 tablet (50 mg) by mouth once daily. 90 tablet 0    rivaroxaban (Xarelto) 20 mg tablet Take 1 tablet (20 mg) by mouth once daily at bedtime. 90 tablet 0    [DISCONTINUED] losartan (Cozaar) 100 mg tablet Take 1 tablet (100 mg) by mouth once daily. 90 tablet 1     No current facility-administered medications on file prior to visit.        Review of Systems   Constitutional:  Negative for chills and fever.   HENT: Negative.     Respiratory: Negative.     Cardiovascular: Negative.    Gastrointestinal: Negative.  Negative for nausea and vomiting.   Endocrine: Negative.    Genitourinary: Negative.    Musculoskeletal:  Positive for arthralgias, neck pain and neck stiffness.   Skin: Negative.  Negative for rash.   Allergic/Immunologic: Negative.    Neurological: Negative.    Hematological: Negative.    Psychiatric/Behavioral: Negative.     All other systems reviewed and are negative.      Objective   Pulse 92   Resp 20   Ht 1.626 m (5' 4\")   Wt 101 kg (223 lb 3.2 oz)   SpO2 94%   BMI 38.31 kg/m²   BSA: 2.14 meters " squared  Growth percentiles: Facility age limit for growth %francheska is 20 years. Facility age limit for growth %francheska is 20 years.   No visits with results within 1 Week(s) from this visit.   Latest known visit with results is:   Office Visit on 02/06/2024   Component Date Value Ref Range Status    POC HEMOGLOBIN A1c 02/06/2024 5.9  4.2 - 6.5 % Final      Physical Exam  Constitutional:       Appearance: Normal appearance.   Cardiovascular:      Rate and Rhythm: Normal rate and regular rhythm.   Pulmonary:      Effort: Pulmonary effort is normal.      Breath sounds: Normal breath sounds.   Abdominal:      General: Bowel sounds are normal.   Musculoskeletal:         General: Tenderness present.      Cervical back: Rigidity and tenderness present.   Neurological:      General: No focal deficit present.      Mental Status: She is alert.   Psychiatric:         Mood and Affect: Mood normal.         Assessment/Plan   Problem List Items Addressed This Visit             ICD-10-CM    Paroxysmal atrial fibrillation (Multi) I48.0    Relevant Medications    rivaroxaban (Xarelto) 20 mg tablet    metoprolol succinate XL (Toprol-XL) 50 mg 24 hr tablet    Type 2 diabetes mellitus without complication, without long-term current use of insulin (Multi) - Primary E11.9    Relevant Medications    metFORMIN (Glucophage) 500 mg tablet    Other Relevant Orders    Referral to Ophthalmology    POCT glycosylated hemoglobin (Hb A1C) manually resulted (Completed)    Benign essential hypertension I10    Relevant Medications    losartan-hydrochlorothiazide (Hyzaar) 100-25 mg tablet    metoprolol succinate XL (Toprol-XL) 50 mg 24 hr tablet    Pure hypercholesterolemia E78.00    Relevant Medications    atorvastatin (Lipitor) 40 mg tablet    Cervical strain S16.1XXA    Relevant Medications    predniSONE (Deltasone) 20 mg tablet    cyclobenzaprine (Flexeril) 5 mg tablet

## 2024-05-18 VITALS
OXYGEN SATURATION: 94 % | DIASTOLIC BLOOD PRESSURE: 88 MMHG | BODY MASS INDEX: 38.1 KG/M2 | HEART RATE: 92 BPM | HEIGHT: 64 IN | WEIGHT: 223.2 LBS | RESPIRATION RATE: 20 BRPM | SYSTOLIC BLOOD PRESSURE: 138 MMHG

## 2024-05-18 PROBLEM — S16.1XXA CERVICAL STRAIN: Status: ACTIVE | Noted: 2024-05-18

## 2024-05-18 ASSESSMENT — ENCOUNTER SYMPTOMS
FEVER: 0
ARTHRALGIAS: 1
RESPIRATORY NEGATIVE: 1
NECK PAIN: 1
ALLERGIC/IMMUNOLOGIC NEGATIVE: 1
NEUROLOGICAL NEGATIVE: 1
CHILLS: 0
NECK STIFFNESS: 1
NAUSEA: 0
HEMATOLOGIC/LYMPHATIC NEGATIVE: 1
GASTROINTESTINAL NEGATIVE: 1
PSYCHIATRIC NEGATIVE: 1
ENDOCRINE NEGATIVE: 1
VOMITING: 0
CARDIOVASCULAR NEGATIVE: 1

## 2024-08-13 DIAGNOSIS — E11.9 TYPE 2 DIABETES MELLITUS WITHOUT COMPLICATION, WITHOUT LONG-TERM CURRENT USE OF INSULIN (MULTI): ICD-10-CM

## 2024-08-13 RX ORDER — METFORMIN HYDROCHLORIDE 500 MG/1
500 TABLET ORAL
Qty: 180 TABLET | Refills: 0 | Status: SHIPPED | OUTPATIENT
Start: 2024-08-13 | End: 2025-02-09

## 2024-09-12 ENCOUNTER — OFFICE VISIT (OUTPATIENT)
Dept: PRIMARY CARE | Facility: CLINIC | Age: 59
End: 2024-09-12
Payer: COMMERCIAL

## 2024-09-12 VITALS
RESPIRATION RATE: 16 BRPM | SYSTOLIC BLOOD PRESSURE: 132 MMHG | DIASTOLIC BLOOD PRESSURE: 80 MMHG | WEIGHT: 214 LBS | BODY MASS INDEX: 36.73 KG/M2 | OXYGEN SATURATION: 98 % | HEART RATE: 92 BPM

## 2024-09-12 DIAGNOSIS — M54.16 LUMBAR RADICULOPATHY: ICD-10-CM

## 2024-09-12 DIAGNOSIS — M54.31 SCIATICA OF RIGHT SIDE: Primary | ICD-10-CM

## 2024-09-12 PROCEDURE — 3075F SYST BP GE 130 - 139MM HG: CPT | Performed by: FAMILY MEDICINE

## 2024-09-12 PROCEDURE — 99214 OFFICE O/P EST MOD 30 MIN: CPT | Performed by: FAMILY MEDICINE

## 2024-09-12 PROCEDURE — 3079F DIAST BP 80-89 MM HG: CPT | Performed by: FAMILY MEDICINE

## 2024-09-12 RX ORDER — PREDNISONE 50 MG/1
50 TABLET ORAL DAILY
Qty: 7 TABLET | Refills: 0 | Status: SHIPPED | OUTPATIENT
Start: 2024-09-12 | End: 2024-09-19

## 2024-09-12 RX ORDER — METHOCARBAMOL 750 MG/1
750 TABLET, FILM COATED ORAL 3 TIMES DAILY
Qty: 45 TABLET | Refills: 0 | Status: SHIPPED | OUTPATIENT
Start: 2024-09-12 | End: 2024-09-27

## 2024-09-12 NOTE — PROGRESS NOTES
Subjective   Patient ID: Britany Cruz is a 59 y.o. female who presents for Annual Exam and Pain (RT leg pain x 2 weeks feels like a jed horse).      Back Pain  This is a new problem. The current episode started 1 to 4 weeks ago. The problem occurs constantly. The problem has been gradually worsening since onset. The pain is present in the gluteal, lumbar spine and sacro-iliac. The quality of the pain is described as aching and burning. The pain radiates to the right thigh. The pain is at a severity of 9/10. The pain is moderate. The pain is Worse during the day. The symptoms are aggravated by bending, sitting and lying down. Stiffness is present In the morning, at night and all day. Associated symptoms include numbness and paresthesias. Pertinent negatives include no fever. Risk factors include obesity, poor posture, lack of exercise and sedentary lifestyle. She has tried analgesics, heat, NSAIDs and walking for the symptoms. The treatment provided moderate relief.     Current Outpatient Medications on File Prior to Visit   Medication Sig Dispense Refill    atorvastatin (Lipitor) 40 mg tablet Take 1 tablet (40 mg) by mouth once daily. 90 tablet 1    losartan-hydrochlorothiazide (Hyzaar) 100-25 mg tablet Take 1 tablet by mouth once daily. 90 tablet 1    metFORMIN (Glucophage) 500 mg tablet TAKE 1 TABLET (500 MG) BY MOUTH 2 TIMES DAILY (MORNING AND LATE AFTERNOON). 180 tablet 0    metoprolol succinate XL (Toprol-XL) 50 mg 24 hr tablet Take 1 tablet (50 mg) by mouth once daily. 90 tablet 1    rivaroxaban (Xarelto) 20 mg tablet Take 1 tablet (20 mg) by mouth once daily at bedtime. 90 tablet 1     No current facility-administered medications on file prior to visit.        Review of Systems   Constitutional:  Negative for chills and fever.   HENT: Negative.     Respiratory: Negative.     Cardiovascular: Negative.    Gastrointestinal: Negative.  Negative for nausea and vomiting.   Endocrine: Negative.    Genitourinary:  Negative.    Musculoskeletal:  Positive for back pain.   Skin: Negative.  Negative for rash.   Allergic/Immunologic: Negative.    Neurological:  Positive for numbness and paresthesias.   Hematological: Negative.    Psychiatric/Behavioral: Negative.     All other systems reviewed and are negative.      Objective   /80   Pulse 92   Resp 16   Wt 97.1 kg (214 lb)   SpO2 98%   BMI 36.73 kg/m²   BSA: 2.09 meters squared  Growth percentiles: Facility age limit for growth %francheska is 20 years. Facility age limit for growth %francheska is 20 years.   No visits with results within 1 Week(s) from this visit.   Latest known visit with results is:   Office Visit on 05/17/2024   Component Date Value Ref Range Status    POC HEMOGLOBIN A1c 05/17/2024 5.7  4.2 - 6.5 % Final      Physical Exam  Constitutional:       General: She is not in acute distress.  Eyes:      Extraocular Movements: Extraocular movements intact.   Cardiovascular:      Rate and Rhythm: Normal rate and regular rhythm.   Pulmonary:      Breath sounds: Normal breath sounds.   Abdominal:      General: Bowel sounds are normal.   Musculoskeletal:         General: Tenderness present.      Cervical back: No rigidity.   Skin:     Findings: No rash.   Neurological:      Mental Status: She is alert.   Psychiatric:         Thought Content: Thought content normal.         Assessment/Plan   Problem List Items Addressed This Visit             ICD-10-CM    Sciatica of right side - Primary M54.31    Relevant Medications    predniSONE (Deltasone) 50 mg tablet    methocarbamol (Robaxin) 750 mg tablet    Lumbar radiculopathy M54.16    Relevant Orders    XR lumbar spine complete 4+ views

## 2024-09-15 PROBLEM — M54.16 LUMBAR RADICULOPATHY: Status: ACTIVE | Noted: 2024-09-15

## 2024-09-15 PROBLEM — M54.31 SCIATICA OF RIGHT SIDE: Status: ACTIVE | Noted: 2024-09-15

## 2024-09-15 ASSESSMENT — ENCOUNTER SYMPTOMS
PARESTHESIAS: 1
ENDOCRINE NEGATIVE: 1
CHILLS: 0
CARDIOVASCULAR NEGATIVE: 1
NAUSEA: 0
HEMATOLOGIC/LYMPHATIC NEGATIVE: 1
FEVER: 0
VOMITING: 0
RESPIRATORY NEGATIVE: 1
ALLERGIC/IMMUNOLOGIC NEGATIVE: 1
NUMBNESS: 1
GASTROINTESTINAL NEGATIVE: 1
BACK PAIN: 1
PSYCHIATRIC NEGATIVE: 1

## 2024-09-26 ENCOUNTER — TELEMEDICINE (OUTPATIENT)
Dept: PRIMARY CARE | Facility: CLINIC | Age: 59
End: 2024-09-26
Payer: COMMERCIAL

## 2024-09-26 DIAGNOSIS — M54.31 SCIATICA OF RIGHT SIDE: ICD-10-CM

## 2024-09-26 PROCEDURE — 99214 OFFICE O/P EST MOD 30 MIN: CPT | Performed by: FAMILY MEDICINE

## 2024-09-26 RX ORDER — PREDNISONE 50 MG/1
50 TABLET ORAL DAILY
Qty: 7 TABLET | Refills: 0 | Status: SHIPPED | OUTPATIENT
Start: 2024-09-26 | End: 2024-10-03

## 2024-09-26 RX ORDER — GABAPENTIN 300 MG/1
300 CAPSULE ORAL 3 TIMES DAILY
Qty: 90 CAPSULE | Refills: 0 | Status: SHIPPED | OUTPATIENT
Start: 2024-09-26 | End: 2025-03-25

## 2024-09-26 RX ORDER — HYDROCODONE BITARTRATE AND ACETAMINOPHEN 5; 325 MG/1; MG/1
1 TABLET ORAL EVERY 6 HOURS PRN
Qty: 20 TABLET | Refills: 0 | Status: SHIPPED | OUTPATIENT
Start: 2024-09-26 | End: 2024-10-01

## 2024-09-26 RX ORDER — METHOCARBAMOL 750 MG/1
750 TABLET, FILM COATED ORAL 3 TIMES DAILY
Qty: 45 TABLET | Refills: 0 | Status: SHIPPED | OUTPATIENT
Start: 2024-09-26 | End: 2024-10-11

## 2024-09-29 ASSESSMENT — ENCOUNTER SYMPTOMS
CHILLS: 0
CARDIOVASCULAR NEGATIVE: 1
FEVER: 0
RESPIRATORY NEGATIVE: 1
PSYCHIATRIC NEGATIVE: 1
ENDOCRINE NEGATIVE: 1
BACK PAIN: 1
NUMBNESS: 1
LEG PAIN: 1
PARESTHESIAS: 1
VOMITING: 0
HEMATOLOGIC/LYMPHATIC NEGATIVE: 1
GASTROINTESTINAL NEGATIVE: 1
ALLERGIC/IMMUNOLOGIC NEGATIVE: 1
NAUSEA: 0

## 2024-09-29 NOTE — PROGRESS NOTES
Subjective   Patient ID: Britany Cruz is a 59 y.o. female who presents for Leg Pain (RT x 4 weeks).  An interactive telecommunication system which permits real time communications between the patient (at the originating site) and provider (at the distant site) was utilized to provide this telehealth service.    Verbal consent was requested and obtained from the patient for this telehealth visit.    We have confirmed the patient wishes to see me, Dr. Gwyn Lobato , a board certified Family Medicine physician with an active [place] medical license as well as verified the patient's identity and physical location in [place].  Audio and Visual both.      Back Pain  This is a recurrent problem. The current episode started more than 1 month ago. The problem occurs constantly. The problem has been gradually worsening since onset. The pain is present in the gluteal, lumbar spine and sacro-iliac. The quality of the pain is described as aching and burning. The pain radiates to the right thigh. The pain is at a severity of 10/10. The pain is severe. The pain is Worse during the day. The symptoms are aggravated by bending, sitting and lying down. Stiffness is present In the morning, at night and all day. Associated symptoms include leg pain, numbness and paresthesias. Pertinent negatives include no fever. Risk factors include obesity, poor posture, lack of exercise and sedentary lifestyle. She has tried analgesics, heat, NSAIDs and walking for the symptoms. The treatment provided moderate relief.   Leg Pain   Associated symptoms include numbness.   And weakness right lower ext  Current Outpatient Medications on File Prior to Visit   Medication Sig Dispense Refill    atorvastatin (Lipitor) 40 mg tablet Take 1 tablet (40 mg) by mouth once daily. 90 tablet 1    losartan-hydrochlorothiazide (Hyzaar) 100-25 mg tablet Take 1 tablet by mouth once daily. 90 tablet 1    metFORMIN (Glucophage) 500 mg tablet TAKE 1 TABLET (500 MG) BY MOUTH 2 TIMES  DAILY (MORNING AND LATE AFTERNOON). 180 tablet 0    metoprolol succinate XL (Toprol-XL) 50 mg 24 hr tablet Take 1 tablet (50 mg) by mouth once daily. 90 tablet 1    rivaroxaban (Xarelto) 20 mg tablet Take 1 tablet (20 mg) by mouth once daily at bedtime. 90 tablet 1    [DISCONTINUED] methocarbamol (Robaxin) 750 mg tablet Take 1 tablet (750 mg) by mouth 3 times a day for 15 days. 45 tablet 0    [DISCONTINUED] predniSONE (Deltasone) 50 mg tablet Take 1 tablet (50 mg) by mouth once daily for 7 days. 7 tablet 0     No current facility-administered medications on file prior to visit.        Review of Systems   Constitutional:  Negative for chills and fever.   HENT: Negative.     Respiratory: Negative.     Cardiovascular: Negative.    Gastrointestinal: Negative.  Negative for nausea and vomiting.   Endocrine: Negative.    Genitourinary: Negative.    Musculoskeletal:  Positive for back pain.   Skin: Negative.  Negative for rash.   Allergic/Immunologic: Negative.    Neurological:  Positive for numbness and paresthesias.   Hematological: Negative.    Psychiatric/Behavioral: Negative.     All other systems reviewed and are negative.      Objective   There were no vitals taken for this visit.  BSA: There is no height or weight on file to calculate BSA.  Growth percentiles: Facility age limit for growth %francheska is 20 years. Facility age limit for growth %francheska is 20 years.   No visits with results within 1 Week(s) from this visit.   Latest known visit with results is:   Office Visit on 05/17/2024   Component Date Value Ref Range Status    POC HEMOGLOBIN A1c 05/17/2024 5.7  4.2 - 6.5 % Final      Physical Exam  Constitutional:       Appearance: She is obese.      Comments: Tearful due to pain   Neurological:      Mental Status: She is alert.         Assessment/Plan   Problem List Items Addressed This Visit             ICD-10-CM    Sciatica of right side M54.31    Relevant Medications    predniSONE (Deltasone) 50 mg tablet     methocarbamol (Robaxin) 750 mg tablet    gabapentin (Neurontin) 300 mg capsule    HYDROcodone-acetaminophen (Norco) 5-325 mg tablet    Other Relevant Orders    MR lumbar spine wo IV contrast    Referral to Pain Medicine    Referral to Physical Therapy

## 2024-09-30 ENCOUNTER — HOSPITAL ENCOUNTER (OUTPATIENT)
Dept: RADIOLOGY | Facility: CLINIC | Age: 59
Discharge: HOME | End: 2024-09-30
Payer: COMMERCIAL

## 2024-09-30 DIAGNOSIS — M54.16 LUMBAR RADICULOPATHY: ICD-10-CM

## 2024-09-30 PROCEDURE — 72110 X-RAY EXAM L-2 SPINE 4/>VWS: CPT | Performed by: RADIOLOGY

## 2024-09-30 PROCEDURE — 72110 X-RAY EXAM L-2 SPINE 4/>VWS: CPT

## 2024-10-09 ENCOUNTER — OFFICE VISIT (OUTPATIENT)
Dept: PAIN MEDICINE | Facility: HOSPITAL | Age: 59
End: 2024-10-09
Payer: COMMERCIAL

## 2024-10-09 DIAGNOSIS — M51.362 DEGENERATION OF INTERVERTEBRAL DISC OF LUMBAR REGION WITH DISCOGENIC BACK PAIN AND LOWER EXTREMITY PAIN: ICD-10-CM

## 2024-10-09 DIAGNOSIS — M54.16 LUMBAR RADICULOPATHY: Primary | ICD-10-CM

## 2024-10-09 DIAGNOSIS — M54.31 SCIATICA OF RIGHT SIDE: ICD-10-CM

## 2024-10-09 PROCEDURE — 99204 OFFICE O/P NEW MOD 45 MIN: CPT | Performed by: PAIN MEDICINE

## 2024-10-09 PROCEDURE — 99214 OFFICE O/P EST MOD 30 MIN: CPT | Performed by: PAIN MEDICINE

## 2024-10-09 ASSESSMENT — PAIN DESCRIPTION - DESCRIPTORS: DESCRIPTORS: SHOOTING;NUMBNESS;ACHING

## 2024-10-09 ASSESSMENT — PAIN - FUNCTIONAL ASSESSMENT: PAIN_FUNCTIONAL_ASSESSMENT: 0-10

## 2024-10-09 ASSESSMENT — PAIN SCALES - GENERAL: PAINLEVEL_OUTOF10: 3

## 2024-10-09 NOTE — PROGRESS NOTES
Subjective   Patient ID: Britany Cruz is a 59 y.o. female with a past medical history of AF on Xarelto       HPI:   Presenting with LBP radiating to RLE posterior thigh for 6+ weeks. Denies bowel or bladder incontinence or new falls or weakness. Alleviated by gabapentin. Aggravated by standing sitting up walking. Has tried prednisone, gabapentin 300mg TID and robaxin. MRI ordered but not done. On Xarelto for AF. She has PT scheduled in future. She had a MRI ordered but never had it done. No falls but noticing some weakness in RLE. She has done home supervised exercises with no benefit.     I have personally reviewed the [place]AR[place] report for Britany Cruz I have considered the risks of abuse, dependence, addiction and diversion    [place]AR[place]:  No data recorded  I have personally reviewed the [place]AR[place] report for Britany Cruz. I have considered the risks of abuse, dependence, addiction and diversion    Is the patient prescribed a combination of a benzodiazepine and opioid?  No  Controlled Substance Agreement:  Reviewed Controlled Substance Agreement including but not limited to the benefits, risks, and alternatives to treatment with a Controlled Substance medication(s).        Review of Systems   13-point ROS done and negative except for HPI.     Current Outpatient Medications   Medication Instructions    atorvastatin (LIPITOR) 40 mg, oral, Daily    gabapentin (NEURONTIN) 300 mg, oral, 3 times daily    losartan-hydrochlorothiazide (Hyzaar) 100-25 mg tablet 1 tablet, oral, Daily    metFORMIN (GLUCOPHAGE) 500 mg, oral, 2 times daily (morning and late afternoon)    methocarbamol (ROBAXIN) 750 mg, oral, 3 times daily    metoprolol succinate XL (TOPROL-XL) 50 mg, oral, Daily    rivaroxaban (XARELTO) 20 mg, oral, Nightly       No past medical history on file.     Past Surgical History:   Procedure Laterality Date     SECTION, LOW TRANSVERSE          Family History   Problem Relation Name Age of Onset    Cancer Mother      Lung cancer  Other          No Known Allergies     Objective     There were no vitals filed for this visit.     Physical Exam  General: NAD, well groomed, well nourished  Eyes: Non-icteric sclera, EOMI  Ears, Nose, Mouth, and Throat: External ears and nose appear to be without deformity or rash. No lesions or masses noted. Hearing is grossly intact.   Neck: Trachea midline  Respiratory: Nonlabored breathing   Cardiovascular: no peripheral edema   Skin: No rashes or open lesions/ulcers identified on skin.  Psychiatric: Alert, orientation to person, place, and time. Cooperative.      BACK:  Full ROM of lumbar flexion, extension, rotation, or side bending with no pain  No pain with Facet Loading to B side  No TTP to lumbar spinous processes, paraspinals  SLR negative of BLE  4/5 RLE DF PF EHL      Imaging personally reviewed and independently interpreted:     XR L Spine 10/1  There is no fracture. There is no spondylolisthesis. There is no disc  space narrowing or osteophytosis. There is mild facet disease lower  lumbar spine. The prevertebral soft tissues are within normal limits.      IMPRESSION:  Mild facet disease lower lumbar spine. 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 Has been taking prednisone, gabapentin flexeril with some relief. No relief from hydrocodone. . Patient would be a great candidate for FLYNN under fluoroscopic guidance for lumbar radiculopathy but would like MRI first. She failed supervised HEPs.     Plan:  - Continue gabapentin and flexeril  - Stop hydrocodone  - Schedule MRI L spine  - If no relief, consider FLYNN in future    The patient was invited to contact us back anytime with any questions or concerns and follow-up with us in the office as needed.     Diagnoses and all orders for this visit:  Lumbar radiculopathy  -     MR lumbar spine wo IV contrast; Future  Sciatica of right  side  -     Referral to Pain Medicine  -     MR lumbar spine wo IV contrast; Future  Degeneration of intervertebral disc of lumbar region with discogenic back pain and lower extremity pain      This note was generated with the aid of dictation software, there may be typos despite my attempts at proofreading.     Patient seen and plan of care discussed with Dr. Yazmin North MD  PM&R PGY3

## 2024-10-15 ENCOUNTER — APPOINTMENT (OUTPATIENT)
Dept: RADIOLOGY | Facility: CLINIC | Age: 59
End: 2024-10-15
Payer: COMMERCIAL

## 2024-10-23 ENCOUNTER — HOSPITAL ENCOUNTER (OUTPATIENT)
Dept: RADIOLOGY | Facility: CLINIC | Age: 59
End: 2024-10-23
Payer: COMMERCIAL

## 2024-11-11 DIAGNOSIS — I10 BENIGN ESSENTIAL HYPERTENSION: ICD-10-CM

## 2024-11-11 RX ORDER — METOPROLOL SUCCINATE 50 MG/1
50 TABLET, EXTENDED RELEASE ORAL DAILY
Qty: 90 TABLET | Refills: 1 | Status: SHIPPED | OUTPATIENT
Start: 2024-11-11

## 2024-11-11 RX ORDER — LOSARTAN POTASSIUM AND HYDROCHLOROTHIAZIDE 25; 100 MG/1; MG/1
1 TABLET ORAL DAILY
Qty: 90 TABLET | Refills: 1 | Status: SHIPPED | OUTPATIENT
Start: 2024-11-11

## 2024-11-11 NOTE — PROGRESS NOTES
Physical Therapy    Physical Therapy Evaluation and Treatment      Patient Name: Britany Cruz  MRN: [id number]  Today's Date: 11/12/24  Visit 1    Time Entry:   Time Calculation  Start Time: 1145  Stop Time: 1230  Time Calculation (min): 45 min  PT Evaluation Time Entry  PT Evaluation (Low) Time Entry: 25  PT Therapeutic Procedures Time Entry  Therapeutic Exercise Time Entry: 20                   Assessment:   Patient presents with clinical signs and symptoms consistent with lumbar radiculopathy  with R LE radicular symptoms in the S1 dermatomal distribution.  This is a classic sciatica pattern that is gradually resolving . Britany Cruz  is fearful of return of severe sciatic symptom if she doesn't learn how to exercise correctly. These impairments affect ADLs, work, recreational, exercise, transfer,  lift/carry, and sleep function that requires skilled PT intervention to resolve and enable patient to return to previous level of function. Factors that may affect progress in PT are chronic pain, obesity, medical co-morbidities,  work task strain,  and patient compliance. Patient is a neutral stabilization program candidate.  Initial treatment of education and sciatic nerve gliding HEP was understood by Britany Cruz       Plan:  OP PT Plan  Treatment/Interventions: Cryotherapy, Education/ Instruction, Electrical stimulation, Dry needling, Hot pack, Manual therapy, Self care/ home management, Taping techniques, Therapeutic activities, Therapeutic exercises  PT Plan: Skilled PT  PT Frequency: 1 time per week  Duration: 12  Onset Date: 07/01/24  Certification Period Start Date: 11/12/24  Certification Period End Date: 02/10/25  Rehab Potential: Good  Plan of Care Agreement: Patient    Current Problem:   1. Lumbar radiculopathy  Follow Up In Physical Therapy      2. Sciatica of right side  Referral to Physical Therapy    Follow Up In Physical Therapy          Subjective    Britany Cruz has onset of R LE radicular 4 months ago after  walk and had  LBP with distal R foot radicular symptom.  She is better now and only has occasional  Twinges. No R LE radicular symptom today.  Pain:   0/10 now   Home Living:   Lives with  in 2 story home   Prior Level of Function:  Prior Function Per Pt/Caregiver Report  Hand Dominance: RightInd ADLs    Objective   In standing bilat lumbar thoracic paraspinal hypertonus  Cognition:   A+Ox3  Observation:      Hip joint clearance: PROM  Flexion  R 100 P L 110 deg,  IR   R30  L30   ,   ER  R  40 deg P L 50 ,        Lumbar AROM in standing:    Flex   75%  fingers to mid shin R knee flexes P R hamstring  , Extension   100 %  ,   Sidebending  R 100 %  L 100 %  ,    Myotomal Strength:  L3   R /5   L /5,  L4  R  /5  L  /5, L5 R  /5  L  /5,  S1   R  /5  L  /5 clear    Hip Strength:  Abduction   R  3+/5 P, L  4/5,      L  /5 ,  Flexion R   4/5   L  5/5    Sensation: WNL     Accessory joint mobility: PA glide all lumbar hypomobile  Lumbopelvic mobility: Side glide  R   L   restricted   cleared      Palpation: tenderness to  bilat lumbar faby with hypertonus  R posterior lateral hip musculature and R QL hypertonic and tender  Gait:  Special Tests:   squat strategy    knee dominant    with knee pain    Outcome Measures:  Not done    Treatments:    There ex:   Access Code: [email or web address]  URL: [email or web address]  Date: 11/12/2024  Prepared by: Brien Henao    Exercises  - Supine Sciatic Nerve Glide  - 2 x daily - 7 x weekly - 2 sets - 10 reps  EDUCATION:     extensive education regarding mechanism of injury, relevant functional anatomy, treatment program rational, self management, HEP, and POC Advised use of cold to posterior lateral R hip to desensitize the region after gliding exercise    Goals:  1. Decrease pain to 01/10 severity level  2. R SLR = L SLR  3. Standing fwd bending fingers to distal shin without compensatory R knee bend  4. independent HEP  5. Britany Anthony will demo ability to perform 2  lbs ground to stand and carry 30 feet without pain

## 2024-11-12 ENCOUNTER — EVALUATION (OUTPATIENT)
Dept: PHYSICAL THERAPY | Facility: CLINIC | Age: 59
End: 2024-11-12
Payer: COMMERCIAL

## 2024-11-12 DIAGNOSIS — M54.31 SCIATICA OF RIGHT SIDE: ICD-10-CM

## 2024-11-12 DIAGNOSIS — M54.16 LUMBAR RADICULOPATHY: Primary | ICD-10-CM

## 2024-11-12 DIAGNOSIS — E11.9 TYPE 2 DIABETES MELLITUS WITHOUT COMPLICATION, WITHOUT LONG-TERM CURRENT USE OF INSULIN (MULTI): ICD-10-CM

## 2024-11-12 PROCEDURE — 97110 THERAPEUTIC EXERCISES: CPT | Mod: GP | Performed by: PHYSICAL THERAPIST

## 2024-11-12 PROCEDURE — 97161 PT EVAL LOW COMPLEX 20 MIN: CPT | Mod: GP | Performed by: PHYSICAL THERAPIST

## 2024-11-12 RX ORDER — METFORMIN HYDROCHLORIDE 500 MG/1
500 TABLET ORAL
Qty: 180 TABLET | Refills: 0 | Status: SHIPPED | OUTPATIENT
Start: 2024-11-12 | End: 2025-05-11

## 2024-11-12 ASSESSMENT — ENCOUNTER SYMPTOMS
OCCASIONAL FEELINGS OF UNSTEADINESS: 0
DEPRESSION: 0
LOSS OF SENSATION IN FEET: 0

## 2024-11-22 ENCOUNTER — LAB (OUTPATIENT)
Dept: LAB | Facility: LAB | Age: 59
End: 2024-11-22
Payer: COMMERCIAL

## 2024-11-22 ENCOUNTER — APPOINTMENT (OUTPATIENT)
Dept: PRIMARY CARE | Facility: CLINIC | Age: 59
End: 2024-11-22
Payer: COMMERCIAL

## 2024-11-22 VITALS
SYSTOLIC BLOOD PRESSURE: 124 MMHG | WEIGHT: 215 LBS | DIASTOLIC BLOOD PRESSURE: 84 MMHG | HEART RATE: 86 BPM | BODY MASS INDEX: 36.9 KG/M2

## 2024-11-22 DIAGNOSIS — E66.09 CLASS 2 OBESITY DUE TO EXCESS CALORIES WITHOUT SERIOUS COMORBIDITY WITH BODY MASS INDEX (BMI) OF 36.0 TO 36.9 IN ADULT: ICD-10-CM

## 2024-11-22 DIAGNOSIS — I48.0 PAROXYSMAL ATRIAL FIBRILLATION (MULTI): ICD-10-CM

## 2024-11-22 DIAGNOSIS — E11.9 TYPE 2 DIABETES MELLITUS WITHOUT COMPLICATION, WITHOUT LONG-TERM CURRENT USE OF INSULIN (MULTI): ICD-10-CM

## 2024-11-22 DIAGNOSIS — Z00.00 ROUTINE GENERAL MEDICAL EXAMINATION AT A HEALTH CARE FACILITY: Primary | ICD-10-CM

## 2024-11-22 DIAGNOSIS — E66.812 CLASS 2 OBESITY DUE TO EXCESS CALORIES WITHOUT SERIOUS COMORBIDITY WITH BODY MASS INDEX (BMI) OF 36.0 TO 36.9 IN ADULT: ICD-10-CM

## 2024-11-22 DIAGNOSIS — Z00.00 ROUTINE GENERAL MEDICAL EXAMINATION AT A HEALTH CARE FACILITY: ICD-10-CM

## 2024-11-22 DIAGNOSIS — I10 BENIGN ESSENTIAL HYPERTENSION: ICD-10-CM

## 2024-11-22 DIAGNOSIS — E78.00 PURE HYPERCHOLESTEROLEMIA: ICD-10-CM

## 2024-11-22 LAB
ERYTHROCYTE [DISTWIDTH] IN BLOOD BY AUTOMATED COUNT: 13.1 % (ref 11.5–14.5)
HCT VFR BLD AUTO: 45.4 % (ref 36–46)
HGB BLD-MCNC: 14.6 G/DL (ref 12–16)
MCH RBC QN AUTO: 28.7 PG (ref 26–34)
MCHC RBC AUTO-ENTMCNC: 32.2 G/DL (ref 32–36)
MCV RBC AUTO: 89 FL (ref 80–100)
NRBC BLD-RTO: 0 /100 WBCS (ref 0–0)
PLATELET # BLD AUTO: 416 X10*3/UL (ref 150–450)
POC HEMOGLOBIN A1C: 5.8 % (ref 4.2–6.5)
RBC # BLD AUTO: 5.08 X10*6/UL (ref 4–5.2)
WBC # BLD AUTO: 11.7 X10*3/UL (ref 4.4–11.3)

## 2024-11-22 PROCEDURE — 3074F SYST BP LT 130 MM HG: CPT | Performed by: FAMILY MEDICINE

## 2024-11-22 PROCEDURE — 3079F DIAST BP 80-89 MM HG: CPT | Performed by: FAMILY MEDICINE

## 2024-11-22 PROCEDURE — 80061 LIPID PANEL: CPT

## 2024-11-22 PROCEDURE — 99396 PREV VISIT EST AGE 40-64: CPT | Performed by: FAMILY MEDICINE

## 2024-11-22 PROCEDURE — 36415 COLL VENOUS BLD VENIPUNCTURE: CPT

## 2024-11-22 PROCEDURE — 1036F TOBACCO NON-USER: CPT | Performed by: FAMILY MEDICINE

## 2024-11-22 PROCEDURE — 82607 VITAMIN B-12: CPT

## 2024-11-22 PROCEDURE — 83036 HEMOGLOBIN GLYCOSYLATED A1C: CPT | Performed by: FAMILY MEDICINE

## 2024-11-22 PROCEDURE — 82306 VITAMIN D 25 HYDROXY: CPT

## 2024-11-22 PROCEDURE — 85027 COMPLETE CBC AUTOMATED: CPT

## 2024-11-22 PROCEDURE — 84443 ASSAY THYROID STIM HORMONE: CPT

## 2024-11-22 PROCEDURE — 80053 COMPREHEN METABOLIC PANEL: CPT

## 2024-11-22 RX ORDER — METFORMIN HYDROCHLORIDE 500 MG/1
500 TABLET ORAL
Qty: 180 TABLET | Refills: 1 | Status: SHIPPED | OUTPATIENT
Start: 2024-11-22 | End: 2025-05-21

## 2024-11-22 ASSESSMENT — ENCOUNTER SYMPTOMS
OCCASIONAL FEELINGS OF UNSTEADINESS: 0
DEPRESSION: 0
LOSS OF SENSATION IN FEET: 0

## 2024-11-22 ASSESSMENT — PATIENT HEALTH QUESTIONNAIRE - PHQ9
SUM OF ALL RESPONSES TO PHQ9 QUESTIONS 1 AND 2: 0
2. FEELING DOWN, DEPRESSED OR HOPELESS: NOT AT ALL
1. LITTLE INTEREST OR PLEASURE IN DOING THINGS: NOT AT ALL

## 2024-11-22 NOTE — ASSESSMENT & PLAN NOTE
Education provided re: diabetes pathophysiology and management, including effectiveness of diet, exercise, and medication in controlling sugar and preventing complications, as well as information on the complications and recommended care. I want your fasting morning blood sugar to be less than 100.   Goal is for your sugar to be less than 140 two hours after a meal.  A1c stable continue metformin

## 2024-11-22 NOTE — PROGRESS NOTES
Subjective     Patient ID: Britany Cruz is a 59 y.o. female who presents for Annual Exam and Follow-up.      Last Physical : 1 Years ago     Pt's PMH, PSH, SH, FH , meds and allergies was obtained / reviewed and updated .     Dental visits : Y  Vision issues : N  Hearing issues : N    Immunizations : Y    Diet :  could be better  Exercise:  Weight concerns :     Alcohol: as noted in SH  Tobacco: as noted in SH  Recreational drug use : None/ as noted in SH    Sexually active : Active   Contraception :   Menstrual problems:  Premenopausal/perimenopausal/ postmenopausal:      Parity:  Full term:    Premature:   (s):   Living :  Ab induced:   Ab spontaneous :  Ectopic :   Multiple :    PAP smear :  Mammogram : Refused mammogram  Colonoscopy: Refused colonoscopy    Metabolic screening   - Lipid   - Glucose  Patient is here for follow-up for diabetes atrial fibrillation hypertension blood pressure is elevated today.  Normal at home back pain is better has been doing physical therapy also doing exercises at home still has some numbness in the toes of the right side has been trying to lose weight has lost 8 pounds  On Xarelto for atrial fibrillation no signs of bleeding  ======================================================    Visit Vitals  /84   Pulse 86   Wt 97.5 kg (215 lb)   BMI 36.90 kg/m²   OB Status Perimenopausal   Smoking Status Never   BSA 2.1 m²      No LMP recorded. Patient is perimenopausal.     =====================================    Review of systems:  Constitutional: no chills, no fever and no night sweats.     Eyes: no blurred vision and no eyesight problems.     ENT: no hearing loss, no nasal congestion, no nasal discharge, no hoarseness and no sore throat.     Cardiovascular: no chest pain, no intermittent leg claudication, no lower extremity edema, no palpitations and no syncope.     Respiratory: no cough, no shortness of breath during exertion, no shortness of breath at rest and no  wheezing.     Gastrointestinal: no abdominal pain, no blood in stools, no constipation, no diarrhea, no melena, no nausea, no rectal pain and no vomiting.     Genitourinary: no dysuria, no change in urinary frequency, no urinary hesitancy, no feelings of urinary urgency and no vaginal discharge.     Musculoskeletal: Back pain radiating to the right leg with numbness at the toes  Integumentary: no new skin lesions and no rashes.     Neurological: no difficulty walking, no headache, no limb weakness, no numbness and no tingling.     Psychiatric: no anxiety, no depression, no anhedonia and no substance use disorders.     Endocrine: no recent weight gain and no recent weight loss.     Hematologic/Lymphatic: no tendency for easy bruising and no swollen glands.   ============================================================    Physical exam :    Constitutional: Alert and in no acute distress. Well developed, well nourished.     Eyes: Normal external exam. Pupils were equal in size, round, reactive to light (PERRL) with normal accommodation and extraocular movements intact (EOMI).     Ears, Nose, Mouth, and Throat: External inspection of ears and nose: Normal.  Otoscopic examination: Normal.      Neck: No neck mass was observed. Supple.     Cardiovascular: Heart rate and rhythm were normal, normal S1 and S2, no gallops, no murmurs and no pericardial rub    Pulmonary: No respiratory distress. Clear bilateral breath sounds.     Abdomen: Soft nontender; no abdominal mass palpated. No organomegaly.     Musculoskeletal: Tenderness of the paraspinal muscles of the lumbar spine: Normal.  Muscle strength/tone: Normal.      Skin: Normal skin color and pigmentation, normal skin turgor, and no rash.     Neurologic: Deep tendon reflexes were 2+ and symmetric. Sensation: Normal.     Psychiatric: Judgment and insight: Intact. Mood and affect: Normal.    Lymphatic : Cervical/ axillary/ groin Lns Palpable/ non palpable            All other  systems have been reviewed and are negative for complaint.      Assessment/Plan    Diagnoses and all orders for this visit:  Routine general medical examination at a health care facility  -     Lipid Panel; Future  -     CBC; Future  -     TSH with reflex to Free T4 if abnormal; Future  -     Comprehensive Metabolic Panel; Future  -     Vitamin B12; Future  -     Vitamin D 25-Hydroxy,Total (for eval of Vitamin D levels); Future  Type 2 diabetes mellitus without complication, without long-term current use of insulin (Multi)  -     POCT glycosylated hemoglobin (Hb A1C) manually resulted  -     metFORMIN (Glucophage) 500 mg tablet; Take 1 tablet (500 mg) by mouth 2 times daily (morning and late afternoon).  -     Albumin-Creatinine Ratio, Urine Random; Future  -     Lipid Panel; Future  -     CBC; Future  -     TSH with reflex to Free T4 if abnormal; Future  -     Comprehensive Metabolic Panel; Future  -     Vitamin B12; Future  -     Vitamin D 25-Hydroxy,Total (for eval of Vitamin D levels); Future  Paroxysmal atrial fibrillation (Multi)  -     rivaroxaban (Xarelto) 20 mg tablet; Take 1 tablet (20 mg) by mouth once daily at bedtime.  Class 2 obesity due to excess calories without serious comorbidity with body mass index (BMI) of 36.0 to 36.9 in adult  -     Comprehensive Metabolic Panel; Future  -     Vitamin B12; Future  -     Vitamin D 25-Hydroxy,Total (for eval of Vitamin D levels); Future  Benign essential hypertension  Pure hypercholesterolemia

## 2024-11-23 LAB
25(OH)D3 SERPL-MCNC: 24 NG/ML (ref 30–100)
ALBUMIN SERPL BCP-MCNC: 4.6 G/DL (ref 3.4–5)
ALP SERPL-CCNC: 80 U/L (ref 33–110)
ALT SERPL W P-5'-P-CCNC: 13 U/L (ref 7–45)
ANION GAP SERPL CALC-SCNC: 12 MMOL/L (ref 10–20)
AST SERPL W P-5'-P-CCNC: 14 U/L (ref 9–39)
BILIRUB SERPL-MCNC: 0.6 MG/DL (ref 0–1.2)
BUN SERPL-MCNC: 22 MG/DL (ref 6–23)
CALCIUM SERPL-MCNC: 10 MG/DL (ref 8.6–10.6)
CHLORIDE SERPL-SCNC: 102 MMOL/L (ref 98–107)
CO2 SERPL-SCNC: 30 MMOL/L (ref 21–32)
CREAT SERPL-MCNC: 0.77 MG/DL (ref 0.5–1.05)
EGFRCR SERPLBLD CKD-EPI 2021: 90 ML/MIN/1.73M*2
GLUCOSE SERPL-MCNC: 107 MG/DL (ref 74–99)
POTASSIUM SERPL-SCNC: 4 MMOL/L (ref 3.5–5.3)
PROT SERPL-MCNC: 7.4 G/DL (ref 6.4–8.2)
SODIUM SERPL-SCNC: 140 MMOL/L (ref 136–145)
TSH SERPL-ACNC: 2.61 MIU/L (ref 0.44–3.98)
VIT B12 SERPL-MCNC: 703 PG/ML (ref 211–911)

## 2024-11-26 DIAGNOSIS — I48.0 PAROXYSMAL ATRIAL FIBRILLATION (MULTI): ICD-10-CM

## 2024-11-26 DIAGNOSIS — Z00.00 ROUTINE GENERAL MEDICAL EXAMINATION AT A HEALTH CARE FACILITY: Primary | ICD-10-CM

## 2024-11-26 LAB
CHOLEST SERPL-MCNC: 161 MG/DL (ref 0–199)
CHOLESTEROL/HDL RATIO: 3
HDLC SERPL-MCNC: 53 MG/DL
LDLC SERPL CALC-MCNC: 82 MG/DL
NON HDL CHOLESTEROL: 108 MG/DL (ref 0–149)
TRIGL SERPL-MCNC: 131 MG/DL (ref 0–149)
VLDL: 26 MG/DL (ref 0–40)

## 2024-11-27 NOTE — PROGRESS NOTES
Physical Therapy    Physical Therapy Treatment      Patient Name: Britany Cruz  MRN: [id number]  Today's Date: 24  Visit 2    Time Entry:   Time Calculation  Start Time: 0730  Stop Time: 0800  Time Calculation (min): 30 min     PT Therapeutic Procedures Time Entry  Therapeutic Exercise Time Entry: 30                   Assessment:   Britany Cruz is asymptomatic . She perceives some R posterior hip tightness when putting shoes and socks on , but is able to achieve the task. All skilled PT goals are achieved and she is able to continue on an independent HEP.     Plan:   Physical Therapy    Discharge Summary    Name: Britany Cruz  MRN: [id number]  : 1965  Date: 24    Discharge Summary: PT    Discharge Information: Date of discharge 24, Date of evaluation 24, and Number of attended visits 2    Therapy Summary: see objective and assessment    Discharge Status: goals achieved     Rehab Discharge Reason: Achieved all and/or the most significant goals(s)    Current Problem:   1. Lumbar radiculopathy  Follow Up In Physical Therapy      2. Sciatica of right side  Follow Up In Physical Therapy          Subjective    Britany Cruz c/o difficulty putting R shoe and sock on . No R LE radicular symptom  Pain:   0/10 now     Objective:  SLR Equal bilat  Hip flexion equal bilat   R Hip abduction 4/5  Fbing fingers to distal shin    Treatments:    There ex:     Dead Bug  - 1 x daily - 7 x weekly - 2 sets - 10 reps  - Supine Figure 4 Piriformis Stretch  - 1 x daily - 7 x weekly - 3 reps - 30 hold  - Standing Anti-Rotation Press with Anchored Resistance  - 1 x daily - 7 x weekly - 2 sets - 20 rep    EDUCATION:     Access Code: [email or web address]  URL: [email or web address]  Date: 2024  Prepared by: Brien Henao    Exercises  - Dead Bug  - 1 x daily - 7 x weekly - 2 sets - 10 reps  - Supine Figure 4 Piriformis Stretch  - 1 x daily - 7 x weekly - 3 reps - 30 hold  - Standing Anti-Rotation Press with  Anchored Resistance  - 1 x daily - 7 x weekly - 2 sets - 20 reps  Advised Britany Cruz to return to her yoga npractice.  Goals:  1. Decrease pain to 01/10 severity level A  2. R SLR = L SLRA  3. Standing fwd bending fingers to distal shin without compensatory R knee Wesley  4. independent HEPA  5. Britany Cruz will demo ability to perform 2 lbs ground to stand and carry 30 feet without painA

## 2024-11-29 ENCOUNTER — TREATMENT (OUTPATIENT)
Dept: PHYSICAL THERAPY | Facility: CLINIC | Age: 59
End: 2024-11-29
Payer: COMMERCIAL

## 2024-11-29 DIAGNOSIS — M54.31 SCIATICA OF RIGHT SIDE: ICD-10-CM

## 2024-11-29 DIAGNOSIS — M54.16 LUMBAR RADICULOPATHY: Primary | ICD-10-CM

## 2024-11-29 PROCEDURE — 97110 THERAPEUTIC EXERCISES: CPT | Mod: GP | Performed by: PHYSICAL THERAPIST

## 2024-12-06 ENCOUNTER — APPOINTMENT (OUTPATIENT)
Dept: PHYSICAL THERAPY | Facility: CLINIC | Age: 59
End: 2024-12-06
Payer: COMMERCIAL

## 2024-12-13 ENCOUNTER — APPOINTMENT (OUTPATIENT)
Dept: PHYSICAL THERAPY | Facility: CLINIC | Age: 59
End: 2024-12-13
Payer: COMMERCIAL

## 2024-12-20 ENCOUNTER — APPOINTMENT (OUTPATIENT)
Dept: PHYSICAL THERAPY | Facility: CLINIC | Age: 59
End: 2024-12-20
Payer: COMMERCIAL

## 2025-01-27 ENCOUNTER — APPOINTMENT (OUTPATIENT)
Dept: OPHTHALMOLOGY | Facility: CLINIC | Age: 60
End: 2025-01-27
Payer: COMMERCIAL

## 2025-02-12 DIAGNOSIS — E78.00 PURE HYPERCHOLESTEROLEMIA: ICD-10-CM

## 2025-02-12 RX ORDER — ATORVASTATIN CALCIUM 40 MG/1
40 TABLET, FILM COATED ORAL DAILY
Qty: 90 TABLET | Refills: 1 | Status: SHIPPED | OUTPATIENT
Start: 2025-02-12

## 2025-03-05 ENCOUNTER — APPOINTMENT (OUTPATIENT)
Dept: PRIMARY CARE | Facility: CLINIC | Age: 60
End: 2025-03-05
Payer: COMMERCIAL

## 2025-03-06 ENCOUNTER — TELEPHONE (OUTPATIENT)
Dept: PRIMARY CARE | Facility: CLINIC | Age: 60
End: 2025-03-06
Payer: COMMERCIAL

## 2025-03-06 DIAGNOSIS — I48.0 PAROXYSMAL ATRIAL FIBRILLATION (MULTI): ICD-10-CM

## 2025-03-06 DIAGNOSIS — M54.16 LUMBAR RADICULOPATHY: Primary | ICD-10-CM

## 2025-03-06 RX ORDER — GABAPENTIN 300 MG/1
300 CAPSULE ORAL 3 TIMES DAILY
Qty: 90 CAPSULE | Refills: 5 | Status: SHIPPED | OUTPATIENT
Start: 2025-03-06 | End: 2025-09-02

## 2025-05-23 ENCOUNTER — APPOINTMENT (OUTPATIENT)
Dept: PRIMARY CARE | Facility: CLINIC | Age: 60
End: 2025-05-23
Payer: COMMERCIAL

## 2025-06-05 ENCOUNTER — APPOINTMENT (OUTPATIENT)
Dept: PRIMARY CARE | Facility: CLINIC | Age: 60
End: 2025-06-05
Payer: COMMERCIAL

## 2025-06-05 DIAGNOSIS — I48.0 PAROXYSMAL ATRIAL FIBRILLATION (MULTI): ICD-10-CM

## 2025-06-05 DIAGNOSIS — M54.16 LUMBAR RADICULOPATHY: ICD-10-CM

## 2025-06-05 DIAGNOSIS — E78.00 PURE HYPERCHOLESTEROLEMIA: ICD-10-CM

## 2025-06-05 DIAGNOSIS — I10 BENIGN ESSENTIAL HYPERTENSION: ICD-10-CM

## 2025-06-05 DIAGNOSIS — E11.9 TYPE 2 DIABETES MELLITUS WITHOUT COMPLICATION, WITHOUT LONG-TERM CURRENT USE OF INSULIN: ICD-10-CM

## 2025-06-05 PROCEDURE — 99214 OFFICE O/P EST MOD 30 MIN: CPT | Performed by: FAMILY MEDICINE

## 2025-06-05 PROCEDURE — 3074F SYST BP LT 130 MM HG: CPT | Performed by: FAMILY MEDICINE

## 2025-06-05 PROCEDURE — 3008F BODY MASS INDEX DOCD: CPT | Performed by: FAMILY MEDICINE

## 2025-06-05 PROCEDURE — 3079F DIAST BP 80-89 MM HG: CPT | Performed by: FAMILY MEDICINE

## 2025-06-05 RX ORDER — METFORMIN HYDROCHLORIDE 500 MG/1
500 TABLET ORAL
Qty: 180 TABLET | Refills: 1 | Status: SHIPPED | OUTPATIENT
Start: 2025-06-05 | End: 2025-12-02

## 2025-06-05 RX ORDER — CARVEDILOL 12.5 MG/1
12.5 TABLET ORAL 2 TIMES DAILY
Qty: 180 TABLET | Refills: 1 | Status: SHIPPED | OUTPATIENT
Start: 2025-06-05 | End: 2025-12-02

## 2025-06-05 RX ORDER — GABAPENTIN 300 MG/1
300 CAPSULE ORAL 3 TIMES DAILY
Qty: 90 CAPSULE | Refills: 5 | Status: CANCELLED | OUTPATIENT
Start: 2025-06-05 | End: 2025-12-02

## 2025-06-05 RX ORDER — ATORVASTATIN CALCIUM 40 MG/1
40 TABLET, FILM COATED ORAL DAILY
Qty: 90 TABLET | Refills: 1 | Status: SHIPPED | OUTPATIENT
Start: 2025-06-05

## 2025-06-05 RX ORDER — LOSARTAN POTASSIUM AND HYDROCHLOROTHIAZIDE 25; 100 MG/1; MG/1
1 TABLET ORAL DAILY
Qty: 90 TABLET | Refills: 1 | Status: SHIPPED | OUTPATIENT
Start: 2025-06-05

## 2025-06-05 RX ORDER — METOPROLOL SUCCINATE 50 MG/1
50 TABLET, EXTENDED RELEASE ORAL DAILY
Qty: 90 TABLET | Refills: 1 | Status: CANCELLED | OUTPATIENT
Start: 2025-06-05

## 2025-06-06 LAB
ALBUMIN SERPL-MCNC: 4.6 G/DL (ref 3.6–5.1)
ALP SERPL-CCNC: 82 U/L (ref 37–153)
ALT SERPL-CCNC: 16 U/L (ref 6–29)
ANION GAP SERPL CALCULATED.4IONS-SCNC: 10 MMOL/L (CALC) (ref 7–17)
AST SERPL-CCNC: 17 U/L (ref 10–35)
BILIRUB SERPL-MCNC: 0.6 MG/DL (ref 0.2–1.2)
BUN SERPL-MCNC: 23 MG/DL (ref 7–25)
CALCIUM SERPL-MCNC: 10.2 MG/DL (ref 8.6–10.4)
CHLORIDE SERPL-SCNC: 102 MMOL/L (ref 98–110)
CO2 SERPL-SCNC: 28 MMOL/L (ref 20–32)
CREAT SERPL-MCNC: 0.74 MG/DL (ref 0.5–1.03)
EGFRCR SERPLBLD CKD-EPI 2021: 94 ML/MIN/1.73M2
ERYTHROCYTE [DISTWIDTH] IN BLOOD BY AUTOMATED COUNT: 13 % (ref 11–15)
EST. AVERAGE GLUCOSE BLD GHB EST-MCNC: 123 MG/DL
EST. AVERAGE GLUCOSE BLD GHB EST-SCNC: 6.8 MMOL/L
GLUCOSE SERPL-MCNC: 110 MG/DL (ref 65–99)
HBA1C MFR BLD: 5.9 %
HCT VFR BLD AUTO: 44.2 % (ref 35–45)
HGB BLD-MCNC: 14.1 G/DL (ref 11.7–15.5)
MCH RBC QN AUTO: 28.9 PG (ref 27–33)
MCHC RBC AUTO-ENTMCNC: 31.9 G/DL (ref 32–36)
MCV RBC AUTO: 90.6 FL (ref 80–100)
PLATELET # BLD AUTO: 372 THOUSAND/UL (ref 140–400)
PMV BLD REES-ECKER: 10 FL (ref 7.5–12.5)
POTASSIUM SERPL-SCNC: 4 MMOL/L (ref 3.5–5.3)
PROT SERPL-MCNC: 7.9 G/DL (ref 6.1–8.1)
RBC # BLD AUTO: 4.88 MILLION/UL (ref 3.8–5.1)
SODIUM SERPL-SCNC: 140 MMOL/L (ref 135–146)
WBC # BLD AUTO: 11.1 THOUSAND/UL (ref 3.8–10.8)

## 2025-06-08 VITALS
SYSTOLIC BLOOD PRESSURE: 127 MMHG | HEART RATE: 57 BPM | WEIGHT: 218 LBS | HEIGHT: 64 IN | BODY MASS INDEX: 37.22 KG/M2 | DIASTOLIC BLOOD PRESSURE: 83 MMHG

## 2025-06-08 ASSESSMENT — ENCOUNTER SYMPTOMS
NAUSEA: 0
VOMITING: 0
CARDIOVASCULAR NEGATIVE: 1
CHILLS: 0
GASTROINTESTINAL NEGATIVE: 1
HEMATOLOGIC/LYMPHATIC NEGATIVE: 1
PSYCHIATRIC NEGATIVE: 1
ALLERGIC/IMMUNOLOGIC NEGATIVE: 1
NEUROLOGICAL NEGATIVE: 1
FEVER: 0
NECK STIFFNESS: 1
ENDOCRINE NEGATIVE: 1
NECK PAIN: 1
ARTHRALGIAS: 1
RESPIRATORY NEGATIVE: 1

## 2025-06-08 NOTE — PROGRESS NOTES
Subjective   Patient ID: Britany Cruz is a 60 y.o. female who presents for Hyperlipidemia, Hypertension, Diabetes, Atrial Fibrillation, and Back Pain.      PDiabetes well-controlled take metformin as prescribed  A-fib seeing cardiology  Blood pressures improved but still high  Had 1 episode of back pain that subsided on its own has been trying to do more home exercises not exercising as much otherwise  Tolerating statin    Shoulder Pain   Pertinent negatives include no fever.     LMP 3 years  unknown PAP\  Normal in the past   Current Outpatient Medications on File Prior to Visit   Medication Sig Dispense Refill    gabapentin (Neurontin) 300 mg capsule Take 1 capsule (300 mg) by mouth 3 times a day. 90 capsule 5    metoprolol succinate XL (Toprol-XL) 50 mg 24 hr tablet TAKE 1 TABLET BY MOUTH EVERY DAY 90 tablet 1    [DISCONTINUED] atorvastatin (Lipitor) 40 mg tablet TAKE 1 TABLET BY MOUTH EVERY DAY 90 tablet 1    [DISCONTINUED] losartan-hydrochlorothiazide (Hyzaar) 100-25 mg tablet TAKE 1 TABLET BY MOUTH EVERY DAY 90 tablet 1    [DISCONTINUED] metFORMIN (Glucophage) 500 mg tablet Take 1 tablet (500 mg) by mouth 2 times daily (morning and late afternoon). 180 tablet 1    [DISCONTINUED] rivaroxaban (Xarelto) 20 mg tablet Take 1 tablet (20 mg) by mouth once daily at bedtime. 90 tablet 1     No current facility-administered medications on file prior to visit.        Review of Systems   Constitutional:  Negative for chills and fever.   HENT: Negative.     Respiratory: Negative.     Cardiovascular: Negative.    Gastrointestinal: Negative.  Negative for nausea and vomiting.   Endocrine: Negative.    Genitourinary: Negative.    Musculoskeletal:  Positive for arthralgias, neck pain and neck stiffness.   Skin: Negative.  Negative for rash.   Allergic/Immunologic: Negative.    Neurological: Negative.    Hematological: Negative.    Psychiatric/Behavioral: Negative.     All other systems reviewed and are negative.      Objective  "  /83   Pulse 57   Ht 1.626 m (5' 4\")   Wt 98.9 kg (218 lb)   BMI 37.42 kg/m²   BSA: 2.11 meters squared  Growth percentiles: Facility age limit for growth %francheska is 20 years. Facility age limit for growth %francheska is 20 years.   Office Visit on 06/05/2025   Component Date Value Ref Range Status    WHITE BLOOD CELL COUNT 06/05/2025 11.1 (H)  3.8 - 10.8 Thousand/uL Final    RED BLOOD CELL COUNT 06/05/2025 4.88  3.80 - 5.10 Million/uL Final    HEMOGLOBIN 06/05/2025 14.1  11.7 - 15.5 g/dL Final    HEMATOCRIT 06/05/2025 44.2  35.0 - 45.0 % Final    MCV 06/05/2025 90.6  80.0 - 100.0 fL Final    MCH 06/05/2025 28.9  27.0 - 33.0 pg Final    MCHC 06/05/2025 31.9 (L)  32.0 - 36.0 g/dL Final    Comment: For adults, a slight decrease in the calculated MCHC  value (in the range of 30 to 32 g/dL) is most likely  not clinically significant; however, it should be  interpreted with caution in correlation with other  red cell parameters and the patient's clinical  condition.      RDW 06/05/2025 13.0  11.0 - 15.0 % Final    PLATELET COUNT 06/05/2025 372  140 - 400 Thousand/uL Final    MPV 06/05/2025 10.0  7.5 - 12.5 fL Final    HEMOGLOBIN A1c 06/05/2025 5.9 (H)  <5.7 % Final    Comment: For someone without known diabetes, a hemoglobin   A1c value between 5.7% and 6.4% is consistent with  prediabetes and should be confirmed with a   follow-up test.     For someone with known diabetes, a value <7%  indicates that their diabetes is well controlled. A1c  targets should be individualized based on duration of  diabetes, age, comorbid conditions, and other  considerations.     This assay result is consistent with an increased risk  of diabetes.     Currently, no consensus exists regarding use of  hemoglobin A1c for diagnosis of diabetes for children.         eAG (mg/dL) 06/05/2025 123  mg/dL Final    eAG (mmol/L) 06/05/2025 6.8  mmol/L Final    GLUCOSE 06/05/2025 110 (H)  65 - 99 mg/dL Final    Comment:               Fasting reference " interval     For someone without known diabetes, a glucose value  between 100 and 125 mg/dL is consistent with  prediabetes and should be confirmed with a  follow-up test.         UREA NITROGEN (BUN) 06/05/2025 23  7 - 25 mg/dL Final    CREATININE 06/05/2025 0.74  0.50 - 1.03 mg/dL Final    EGFR 06/05/2025 94  > OR = 60 mL/min/1.73m2 Final    SODIUM 06/05/2025 140  135 - 146 mmol/L Final    POTASSIUM 06/05/2025 4.0  3.5 - 5.3 mmol/L Final    CHLORIDE 06/05/2025 102  98 - 110 mmol/L Final    CARBON DIOXIDE 06/05/2025 28  20 - 32 mmol/L Final    ELECTROLYTE BALANCE 06/05/2025 10  7 - 17 mmol/L (calc) Final    CALCIUM 06/05/2025 10.2  8.6 - 10.4 mg/dL Final    PROTEIN, TOTAL 06/05/2025 7.9  6.1 - 8.1 g/dL Final    ALBUMIN 06/05/2025 4.6  3.6 - 5.1 g/dL Final    BILIRUBIN, TOTAL 06/05/2025 0.6  0.2 - 1.2 mg/dL Final    ALKALINE PHOSPHATASE 06/05/2025 82  37 - 153 U/L Final    AST 06/05/2025 17  10 - 35 U/L Final    ALT 06/05/2025 16  6 - 29 U/L Final      Physical Exam  Constitutional:       Appearance: Normal appearance.   Cardiovascular:      Rate and Rhythm: Normal rate and regular rhythm.   Pulmonary:      Effort: Pulmonary effort is normal.      Breath sounds: Normal breath sounds.   Abdominal:      General: Bowel sounds are normal.   Musculoskeletal:         General: Tenderness present.      Cervical back: Rigidity and tenderness present.   Neurological:      General: No focal deficit present.      Mental Status: She is alert.   Psychiatric:         Mood and Affect: Mood normal.         Assessment/Plan   Problem List Items Addressed This Visit           ICD-10-CM    Paroxysmal atrial fibrillation (Multi) I48.0    Relevant Medications    rivaroxaban (Xarelto) 20 mg tablet    carvedilol (Coreg) 12.5 mg tablet    Other Relevant Orders    CBC (Completed)    Hemoglobin A1C (Completed)    Comprehensive Metabolic Panel (Completed)    Type 2 diabetes mellitus without complication, without long-term current use of insulin  E11.9    Relevant Medications    metFORMIN (Glucophage) 500 mg tablet    Other Relevant Orders    CBC (Completed)    Hemoglobin A1C (Completed)    Comprehensive Metabolic Panel (Completed)    Benign essential hypertension I10    Relevant Medications    losartan-hydrochlorothiazide (Hyzaar) 100-25 mg tablet    carvedilol (Coreg) 12.5 mg tablet    Other Relevant Orders    CBC (Completed)    Hemoglobin A1C (Completed)    Comprehensive Metabolic Panel (Completed)    Pure hypercholesterolemia E78.00    Relevant Medications    atorvastatin (Lipitor) 40 mg tablet    Other Relevant Orders    CBC (Completed)    Hemoglobin A1C (Completed)    Comprehensive Metabolic Panel (Completed)    Lumbar radiculopathy M54.16    Relevant Orders    CBC (Completed)    Hemoglobin A1C (Completed)    Comprehensive Metabolic Panel (Completed)

## 2025-06-11 ENCOUNTER — TELEPHONE (OUTPATIENT)
Dept: PRIMARY CARE | Facility: CLINIC | Age: 60
End: 2025-06-11
Payer: COMMERCIAL

## 2025-12-05 ENCOUNTER — APPOINTMENT (OUTPATIENT)
Dept: PRIMARY CARE | Facility: CLINIC | Age: 60
End: 2025-12-05
Payer: COMMERCIAL